# Patient Record
Sex: FEMALE | Race: WHITE | NOT HISPANIC OR LATINO | Employment: UNEMPLOYED | URBAN - METROPOLITAN AREA
[De-identification: names, ages, dates, MRNs, and addresses within clinical notes are randomized per-mention and may not be internally consistent; named-entity substitution may affect disease eponyms.]

---

## 2017-06-05 ENCOUNTER — HOSPITAL ENCOUNTER (INPATIENT)
Facility: HOSPITAL | Age: 42
LOS: 2 days | Discharge: HOME | DRG: 024 | End: 2017-06-07
Attending: EMERGENCY MEDICINE | Admitting: NURSE PRACTITIONER
Payer: COMMERCIAL

## 2017-06-05 DIAGNOSIS — I63.9 CVA (CEREBRAL VASCULAR ACCIDENT): ICD-10-CM

## 2017-06-05 DIAGNOSIS — I25.10 CAD (CORONARY ARTERY DISEASE): ICD-10-CM

## 2017-06-05 DIAGNOSIS — I63.131 EMBOLIC STROKE INVOLVING RIGHT CAROTID ARTERY: ICD-10-CM

## 2017-06-05 PROBLEM — R41.4 HEMINEGLECT: Status: ACTIVE | Noted: 2017-06-05

## 2017-06-05 PROBLEM — E03.9 HYPOTHYROIDISM: Status: ACTIVE | Noted: 2017-06-05

## 2017-06-05 LAB
CHOLEST/HDLC SERPL: 4.4 {RATIO}
HDL/CHOLESTEROL RATIO: 22.8 %
HDLC SERPL-MCNC: 184 MG/DL
HDLC SERPL-MCNC: 42 MG/DL
LDLC SERPL CALC-MCNC: 128.4 MG/DL
NONHDLC SERPL-MCNC: 142 MG/DL
T4 FREE SERPL-MCNC: 1.17 NG/DL
TRIGL SERPL-MCNC: 68 MG/DL

## 2017-06-05 PROCEDURE — 03CK3ZZ EXTIRPATION OF MATTER FROM RIGHT INTERNAL CAROTID ARTERY, PERCUTANEOUS APPROACH: ICD-10-PCS | Performed by: PSYCHIATRY & NEUROLOGY

## 2017-06-05 PROCEDURE — 99291 CRITICAL CARE FIRST HOUR: CPT | Mod: ,,, | Performed by: PSYCHIATRY & NEUROLOGY

## 2017-06-05 PROCEDURE — 96375 TX/PRO/DX INJ NEW DRUG ADDON: CPT

## 2017-06-05 PROCEDURE — 93005 ELECTROCARDIOGRAM TRACING: CPT

## 2017-06-05 PROCEDURE — 96374 THER/PROPH/DIAG INJ IV PUSH: CPT

## 2017-06-05 PROCEDURE — 25000003 PHARM REV CODE 250: Performed by: NURSE PRACTITIONER

## 2017-06-05 PROCEDURE — 037K3DZ DILATION OF RIGHT INTERNAL CAROTID ARTERY WITH INTRALUMINAL DEVICE, PERCUTANEOUS APPROACH: ICD-10-PCS | Performed by: PSYCHIATRY & NEUROLOGY

## 2017-06-05 PROCEDURE — 99291 CRITICAL CARE FIRST HOUR: CPT | Mod: ,,, | Performed by: EMERGENCY MEDICINE

## 2017-06-05 PROCEDURE — 84439 ASSAY OF FREE THYROXINE: CPT

## 2017-06-05 PROCEDURE — B31RZZZ FLUOROSCOPY OF INTRACRANIAL ARTERIES: ICD-10-PCS | Performed by: PSYCHIATRY & NEUROLOGY

## 2017-06-05 PROCEDURE — 99233 SBSQ HOSP IP/OBS HIGH 50: CPT | Mod: ,,, | Performed by: PSYCHIATRY & NEUROLOGY

## 2017-06-05 PROCEDURE — 25500020 PHARM REV CODE 255: Performed by: EMERGENCY MEDICINE

## 2017-06-05 PROCEDURE — 20000000 HC ICU ROOM

## 2017-06-05 PROCEDURE — 83036 HEMOGLOBIN GLYCOSYLATED A1C: CPT

## 2017-06-05 PROCEDURE — 93010 ELECTROCARDIOGRAM REPORT: CPT | Mod: S$GLB,,, | Performed by: INTERNAL MEDICINE

## 2017-06-05 PROCEDURE — 99285 EMERGENCY DEPT VISIT HI MDM: CPT | Mod: 25

## 2017-06-05 PROCEDURE — 25000003 PHARM REV CODE 250: Performed by: PHYSICIAN ASSISTANT

## 2017-06-05 PROCEDURE — 80061 LIPID PANEL: CPT

## 2017-06-05 PROCEDURE — 63600175 PHARM REV CODE 636 W HCPCS: Performed by: RADIOLOGY

## 2017-06-05 RX ORDER — CLOPIDOGREL 300 MG/1
300 TABLET, FILM COATED ORAL ONCE
Status: COMPLETED | OUTPATIENT
Start: 2017-06-05 | End: 2017-06-05

## 2017-06-05 RX ORDER — SODIUM,POTASSIUM PHOSPHATES 280-250MG
2 POWDER IN PACKET (EA) ORAL
Status: DISCONTINUED | OUTPATIENT
Start: 2017-06-05 | End: 2017-06-06

## 2017-06-05 RX ORDER — POTASSIUM CHLORIDE 20 MEQ/15ML
40 SOLUTION ORAL
Status: DISCONTINUED | OUTPATIENT
Start: 2017-06-05 | End: 2017-06-06

## 2017-06-05 RX ORDER — LEVOTHYROXINE SODIUM 125 UG/1
125 TABLET ORAL
Status: DISCONTINUED | OUTPATIENT
Start: 2017-06-06 | End: 2017-06-07 | Stop reason: HOSPADM

## 2017-06-05 RX ORDER — POTASSIUM CHLORIDE 20 MEQ/15ML
60 SOLUTION ORAL
Status: DISCONTINUED | OUTPATIENT
Start: 2017-06-05 | End: 2017-06-06

## 2017-06-05 RX ORDER — LANOLIN ALCOHOL/MO/W.PET/CERES
800 CREAM (GRAM) TOPICAL
Status: DISCONTINUED | OUTPATIENT
Start: 2017-06-05 | End: 2017-06-06

## 2017-06-05 RX ORDER — LABETALOL HYDROCHLORIDE 5 MG/ML
10 INJECTION, SOLUTION INTRAVENOUS EVERY 4 HOURS PRN
Status: DISCONTINUED | OUTPATIENT
Start: 2017-06-05 | End: 2017-06-07 | Stop reason: HOSPADM

## 2017-06-05 RX ORDER — ACETAMINOPHEN 325 MG/1
650 TABLET ORAL EVERY 4 HOURS PRN
Status: DISCONTINUED | OUTPATIENT
Start: 2017-06-05 | End: 2017-06-07 | Stop reason: HOSPADM

## 2017-06-05 RX ORDER — ONDANSETRON 2 MG/ML
INJECTION INTRAMUSCULAR; INTRAVENOUS CODE/TRAUMA/SEDATION MEDICATION
Status: COMPLETED | OUTPATIENT
Start: 2017-06-05 | End: 2017-06-05

## 2017-06-05 RX ORDER — MIDAZOLAM HYDROCHLORIDE 1 MG/ML
4 INJECTION INTRAMUSCULAR; INTRAVENOUS
Status: DISPENSED | OUTPATIENT
Start: 2017-06-05 | End: 2017-06-06

## 2017-06-05 RX ORDER — CLOPIDOGREL BISULFATE 75 MG/1
75 TABLET ORAL DAILY
Status: DISCONTINUED | OUTPATIENT
Start: 2017-06-06 | End: 2017-06-07 | Stop reason: HOSPADM

## 2017-06-05 RX ORDER — INSULIN ASPART 100 [IU]/ML
1-10 INJECTION, SOLUTION INTRAVENOUS; SUBCUTANEOUS EVERY 6 HOURS PRN
Status: DISCONTINUED | OUTPATIENT
Start: 2017-06-05 | End: 2017-06-07 | Stop reason: HOSPADM

## 2017-06-05 RX ORDER — GLUCAGON 1 MG
1 KIT INJECTION
Status: DISCONTINUED | OUTPATIENT
Start: 2017-06-05 | End: 2017-06-07 | Stop reason: HOSPADM

## 2017-06-05 RX ORDER — MIDAZOLAM HYDROCHLORIDE 1 MG/ML
INJECTION, SOLUTION INTRAMUSCULAR; INTRAVENOUS CODE/TRAUMA/SEDATION MEDICATION
Status: COMPLETED | OUTPATIENT
Start: 2017-06-05 | End: 2017-06-05

## 2017-06-05 RX ORDER — ASPIRIN 300 MG/1
300 SUPPOSITORY RECTAL ONCE
Status: DISCONTINUED | OUTPATIENT
Start: 2017-06-05 | End: 2017-06-07

## 2017-06-05 RX ORDER — HYDRALAZINE HYDROCHLORIDE 20 MG/ML
10 INJECTION INTRAMUSCULAR; INTRAVENOUS EVERY 4 HOURS PRN
Status: DISCONTINUED | OUTPATIENT
Start: 2017-06-05 | End: 2017-06-06

## 2017-06-05 RX ORDER — ASPIRIN 325 MG
325 TABLET ORAL DAILY
Status: DISCONTINUED | OUTPATIENT
Start: 2017-06-05 | End: 2017-06-07 | Stop reason: HOSPADM

## 2017-06-05 RX ADMIN — ACETAMINOPHEN 650 MG: 325 TABLET ORAL at 09:06

## 2017-06-05 RX ADMIN — ONDANSETRON 4 MG: 2 INJECTION INTRAMUSCULAR; INTRAVENOUS at 02:06

## 2017-06-05 RX ADMIN — ACETAMINOPHEN 650 MG: 325 TABLET ORAL at 05:06

## 2017-06-05 RX ADMIN — MIDAZOLAM HYDROCHLORIDE 2 MG: 1 INJECTION, SOLUTION INTRAMUSCULAR; INTRAVENOUS at 02:06

## 2017-06-05 RX ADMIN — ASPIRIN 325 MG ORAL TABLET 325 MG: 325 PILL ORAL at 05:06

## 2017-06-05 RX ADMIN — IOHEXOL 100 ML: 350 INJECTION, SOLUTION INTRAVENOUS at 02:06

## 2017-06-05 RX ADMIN — CLOPIDOGREL BISULFATE 300 MG: 300 TABLET, FILM COATED ORAL at 09:06

## 2017-06-05 RX ADMIN — IOHEXOL 65 ML: 300 INJECTION, SOLUTION INTRAVENOUS at 02:06

## 2017-06-05 NOTE — PROGRESS NOTES
[facility]  Neurocritical Care  Progress Note    Admit Date: 6/5/2017  Service Date: 06/05/2017  Length of Stay: 0    Subjective:     Chief Complaint: Embolic stroke involving right carotid artery    History of Present Illness: Aziza Cope is a 42 y.o. female who presents with mild mild headache, nausea, acute onset of left hemiplegia. Last seen normal at 1045 and last heard on phone normal at 1055, family member came back into the house at 1100 to see she had fallen on the couch, noted to have facial droop and left sided weakness. These symptoms have never happened before. There are no identified triggers or modifying factors. There have been no recurrent events. There are no other associated symptoms. Patient given tPA and transferred to [facility] to undergo IR intervention.  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    Hospital Course: 6/5/17: right ICA stent, left ICA terminus thrombectomy    Review of Symptoms:   Constitutional: Denies fevers or chills.  ENT: no hearing difficulty, no visual changes  Pulmonary: Denies shortness of breath or cough.  Cardiology: Denies chest pain or palpitations.  GI: Denies abdominal pain or constipation.  Neurologic: Denies new weakness, headaches, or paresthesias.   : no dysuria  Musk: no muscle pain, no joint pain  Psych: no hallucinations    Physical Exam:  GA: Alert, mild distress, very anxious  HEENT: No scleral icterus or JVD.   Pulmonary: Clear to auscultation A/L. No wheezing, crackles, or rhonchi.  Cardiac: RRR S1 & S2 w/o rubs/murmurs/gallops.   Abdominal: Bowel sounds present x 4. No appreciable hepatosplenomegaly.  Skin: No jaundice, rashes, or visible lesions.  Pulses: 2+ DP bilat    Neuro:  --sedation: none  --GCS: E4V5M6  --Mental Status:  AOx3, anxious  --CN II-XII grossly intact. Other than facial droop  --Pupils 3-->2mm, PERRL.   --brainstem: intact  --Motor: left 4/5, right 5/5  --sensory: intact to soft touch and  pain throughout  --Reflexes: not tested  --Gait: deferred      Recent Labs  Lab 06/05/17  1144   WBC 8.70   RBC 4.85   HGB 15.0   HCT 44.7      MCV 92   MCH 30.8   MCHC 33.5       Recent Labs  Lab 06/05/17  1144   CALCIUM 8.8   PROT 7.5      K 4.2   CO2 22*      BUN 12   CREATININE 0.70   ALKPHOS 69   ALT 46*   AST 33   BILITOT 0.8       Recent Labs  Lab 06/05/17  1144   INR 1.1   APTT 33.8     Oxygen Concentration (%):  [100] 100      I have personally reviewed all labs, imaging, and studies today            Assessment/Plan:     Neuro   * Embolic stroke involving right carotid artery    -s/p tPA  -s/p right ICA web stenting  -s/p left ICA terminus thrombectomy  -- post procedure  --repeat head CT at 1800 then PLX load 300mg with 75mg maintenance for stent  --PT OT SLP  --stroke work up (including hypercoag panel as outpatient)          Hemineglect    Improved after thrombectomy  Still with facial droop        Cardiac   Essential hypertension    SBP goal < 140 post reperfusion  cardene gtt prn        Endocrine   Hypothyroidism    Continue home synthroid            Prophylaxis:  Venous Thromboembolism: mechanical  Stress Ulcer: None  Ventilator Pneumonia: not applicable     Activity Orders          None        Full Code    Abbe Dia MD  Neurocritical Care  [facility] time 32 minutes

## 2017-06-05 NOTE — CONSULTS
[facility]  Vascular Neurology  Comprehensive Stroke Center  Consult Note      Inpatient consult to Vascular (Stroke) Neurology  Consult performed by: BETY ADAMS  Consult ordered by: MARA MACHUCA        Subjective:     History of Present Illness:  42 y.o. female  with PMHx of HTN, hypothyroidism who presented to [facility] with mild headache and nausea starting yesterday with today having acute onset of left hemiplegia. Last seen normal at 1045 and last heard on phone normal at 1055, family member came back into the house at 1100 to see she had fallen on the couch, noted to have facial droop and left sided weakness. These symptoms have never happened before. There are no identified triggers or modifying factors. There have been no recurrent events. There are no other associated symptoms.  Patient seen via telemedicine - was administered tpa for R MCA syndrome. Tpa infusion was completed at 1:20pm.          Past Medical History:   Diagnosis Date    Hypertension     Hypothyroidism      Past Surgical History:   Procedure Laterality Date    THYROIDECTOMY       History reviewed. No pertinent family history.  Social History   Substance Use Topics    Smoking status: Current Every Day Smoker     Types: Vaping with nicotine    Smokeless tobacco: Not on file    Alcohol use No     Review of patient's allergies indicates:  No Known Allergies  Medications: I have reviewed the current medication administration record.    Prescriptions Prior to Admission   Medication Sig Dispense Refill Last Dose    levothyroxine (SYNTHROID) 125 MCG tablet Take 125 mcg by mouth once daily.       triamterene-hydrochlorothiazide 37.5-25 mg (MAXZIDE-25) 37.5-25 mg per tablet Take 1 capsule by mouth once daily. PT STATES SHE IS PRESCRIBED THIS, BUT SHE STOPPED TAKING IT          Review of Systems   Constitutional: Negative for fever.   HENT: Negative for ear discharge.    Eyes: Negative for discharge.    Respiratory: Negative for shortness of breath.    Cardiovascular: Negative for chest pain.   Gastrointestinal: Negative for abdominal pain and nausea.   Endocrine: Negative for polyuria.   Genitourinary: Negative for difficulty urinating.   Musculoskeletal: Negative for arthralgias.   Neurological: Positive for speech difficulty, weakness and headaches.   Psychiatric/Behavioral: Positive for agitation.     Objective:     Vital Signs (Most Recent):  Temp: 97.6 °F (36.4 °C) (17 1530)  Pulse: 64 (17 1620)  Resp: 20 (17 1620)  BP: 118/66 (17 1620)  SpO2: 100 % (17 1620)    Vital Signs Range (Last 24H):  Temp:  [97.6 °F (36.4 °C)-98.2 °F (36.8 °C)]   Pulse:  [55-88]   Resp:  [16-36]   BP: (102-148)/(53-93)   SpO2:  [94 %-100 %]     Physical Exam   Constitutional: She appears well-developed and well-nourished.   HENT:   Head: Normocephalic and atraumatic.   Pulmonary/Chest: Effort normal.       Neurological Exam:   LOC: alert and follows requests  Language: No aphasia  Speech: Dysarthria  Orientation: Person, Place, Time  Visual Fields (CN II): Hemianopsia  left  Pupils (CN III, IV, VI): PERRL  Facial Movement (CN VII): lower weakness left lower  Motor*: Arm Left:  Plegic (0/5), Leg Left:   Paretic:  3/5, Arm Right:   Normal (5/5), Leg Right:   Normal (5/5)  Cerebellar*: Normal limb  Sensation: kristina-hypoesthesia left  Tone: Arm-Left: flaccid; Leg-Left: normal; Arm-Right: normal; Leg-Right: normal  +left sided neglect    NIH Stroke Scale:  Interval: baseline (upon arrival/admit)  Level of Consciousness: 0 - alert  LOC Questions: 0 - answers both correctly  LOC Commands: 0 - performs both correctly  Best Gaze: 2 - forced deviation  Visual: 2 - complete hemianopia  Facial Palsy: 2 - partial  Motor Left Arm: 4 - no movement  Motor Right Arm: 0 - no drift  Motor Left Leg: 3 - no effort against gravity  Motor Right Le - no drift  Limb Ataxia: 0 - absent  Sensory: 1 - mild to moderate  loss  Best Language: 0 - no aphasia  Dysarthria: 1 - mild to moderate dysarthria  Extinction and Inattention: 2 - complete neglect  NIH Stroke Scale Total: 17  Modified Jesus Scale:   Timeline: Prior to symptoms onset  Modified [place] Score: 0 - no symptoms        Laboratory:  CMP:   Recent Labs  Lab 06/05/17  1144   CALCIUM 8.8   ALBUMIN 4.2   PROT 7.5      K 4.2   CO2 22*      BUN 12   CREATININE 0.70   ALKPHOS 69   ALT 46*   AST 33   BILITOT 0.8     BMP:   Recent Labs  Lab 06/05/17  1144      K 4.2      CO2 22*   BUN 12   CREATININE 0.70   CALCIUM 8.8     CBC:   Recent Labs  Lab 06/05/17  1144   WBC 8.70   RBC 4.85   HGB 15.0   HCT 44.7      MCV 92   MCH 30.8   MCHC 33.5     Lipid Panel: No results for input(s): CHOL, LDLCALC, HDL, TRIG in the last 168 hours.  Coagulation:   Recent Labs  Lab 06/05/17  1144   INR 1.1   APTT 33.8     Platelet Aggregation Study: No results for input(s): PLTAGG, PLTAGINTERP, PLTAGREGLACO, ADPPLTAGGREG in the last 168 hours.  Hgb A1C: No results for input(s): HGBA1C in the last 168 hours.  TSH:   Recent Labs  Lab 06/05/17  1144   TSH <0.02*       Diagnostic Results:  Brain Imaging:     CT head without contrast (6/5/17)  Hyperdense R MCA sign. ASPECT score 9.          CTA MP (6/5/17)  Occlusion of the right ICA terminus and right M1 segment with reconstitution of flow distally at the right M2 segment with good collateral formation.  Web in the posteromedial surface of the origin of the right ICA with mild flow limitation.    Assessment/Plan:     Patient is a 42 y.o. year old female with:    * Embolic stroke involving right carotid artery    Patient is a 42 yr old female with PMHx of hypothyroidism, HTN who presented with R MCA syndrome. S/p tpa. CT remarkable for hyperdense MCA sign, ASPECT score of 9. IR angiogram remarkable for R carotid T occlusion. R carotid web also seen - thought to be a possible etiology for the stroke in the absence of other  known stroke risk factors in this young patient. However, if tele this admission unrevealing for PAF, will need longer term cardiac monitoring to complete workup. 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     Antithrombotics for secondary stroke prevention: aspirin 325mg now. Recommend repeat CT head at 6pm. If no hemorrhage on imaging, recommend Plavix load - 300mg followed by 75mg qd - dual antiplatelet therapy and early administration prior to 24 hour window post tpa is in the setting of new R ICA stent.   Statins for secondary stroke prevention and hyperlipidemia, if present: Atorvastatin- 40 mg oral daily  Aggressive risk factor modification: Hypertension, Rehab Efforts: Physical Therapy, Occupational Therapy and Speech and Language Pathology  Diagnostics: Ordered/Pending CT scan of head without contrast to asses brain parenchyma, MRI head without contrast to assess brain parenchyma, Trans-thoracic cardiac echo to assess cardiac function/status   VTE Prophylaxis: hold for 24 hours post tpa (infusion completed at 1:20pm on 6/5)   BP Parameters: SBP <140 post thrombectomy   Nursing Orders: Neuro checks- q1h, Complete FIOR unless evaluated by speech, Seizure precautions, Out of bed BID, Avoid Boswell catheter, Pneumatic compression device, Stroke Education, Blood glucose target 100-130, Up with assistance        Hypothyroidism    Continue home dose synthroid        Hemineglect    2/2 stroke  PT/OT        Acute left hemiparesis    Left arm predominant hemiparesis  2/2 stroke  - PT/OT        Essential hypertension    Stroke risk factor  SBP <140 post thrombectomy          Admit to [facility]    Fina Tilley MD  Comprehensive Stroke Center  Department of Vascular Neurology   [facility]

## 2017-06-05 NOTE — CONSULTS
Consulted due to R ICA occlusion. Hyperdense MCA sign.    Carotid web on the R ICA    Favorable CT scan.  To IR    ASA: 2  Mall 1    Raz Vega MD  Vascular and Interventional Neurology Staff  Director of [facility] Stroke Center  [facility]  [phone]

## 2017-06-05 NOTE — HOSPITAL COURSE
6/5/17: right ICA stent, left ICA terminus thrombectomy  6/6 24h MRI Brain pending. PT/OT/SLP. Transfer to floor under [facility]. Neuro service.

## 2017-06-05 NOTE — ED PROVIDER NOTES
Encounter Date: 6/5/2017    SCRIBE #1 NOTE: I, Sunshine Medina, am scribing for, and in the presence of,  Dr. Hong. I have scribed the entire note.       History     Chief Complaint   Patient presents with    transfer     post tpa from [facility]     Review of patient's allergies indicates:  No Known Allergies  Time patient was seen by the provider: 1:33 PM      The patient is a 42 y.o. female with hx of: HTN and hypothyroidisim that presents to the ED via EMS from [facility] after arriving there approximately 2 hours ago with left-sided facial droop as well as left upper and lower extremity weakness and slurred speech. She received tPA prior to arrival and [place] [facility] [place]ic work up and evaluation. Upon arrival, patient still complains of left-sided weakness and inability to life the left upper extremity. She additionally reports nausea and a headache.            Past Medical History:   Diagnosis Date    Hypertension     Hypothyroidism      Past Surgical History:   Procedure Laterality Date    THYROIDECTOMY       History reviewed. No pertinent family history.  Social History   Substance Use Topics    Smoking status: Current Every Day Smoker     Types: Vaping with nicotine    Smokeless tobacco: Not on file    Alcohol use No     Review of Systems   Constitutional: Negative for chills.   HENT: Negative for congestion.    Eyes: Negative for redness.   Respiratory: Negative for cough and shortness of breath.    Cardiovascular: Negative for chest pain.   Gastrointestinal: Positive for nausea.   Genitourinary: Negative for difficulty urinating and dysuria.   Musculoskeletal: Negative for back pain and neck pain.   Skin: Negative for rash.   Neurological: Positive for weakness (left sided) and headaches.       Physical Exam     Initial Vitals [06/05/17 1334]   BP Pulse Resp Temp SpO2   110/70 69 20 98 °F (36.7 °C) (!) 94 %     Physical Exam    Nursing note and vitals  reviewed.  Constitutional: She appears well-developed and well-nourished. She is not diaphoretic. No distress.   HENT:   Head: Normocephalic and atraumatic.   Mouth/Throat: Oropharynx is clear and moist.   Eyes: Conjunctivae and EOM are normal. Pupils are equal, round, and reactive to light.   Neck: Normal range of motion. Neck supple. No JVD present.   Cardiovascular: Normal rate, regular rhythm and normal heart sounds.   No murmur heard.  Pulmonary/Chest: Breath sounds normal. No respiratory distress. She has no wheezes. She has no rhonchi. She has no rales.   Abdominal: Soft. Bowel sounds are normal. She exhibits no distension and no mass. There is no tenderness. There is no rebound and no guarding.   Musculoskeletal: Normal range of motion. She exhibits no edema or tenderness.   Neurological:   Has left-sided hemiparesis. Speech clear.    Skin: Skin is warm and dry. No rash noted.   Psychiatric: She has a normal mood and affect.         ED Course   Procedures  Labs Reviewed - No data to display          Medical Decision Making:   History:   Old Medical Records: I decided to obtain old medical records.  Initial Assessment:   Patient with acute stroke. Notified stroke team upon arrival. She was agitated in CT scan so gave a dose of Versed.  Clinical Tests:   Radiological Study: Ordered and Reviewed  Medical Tests: Ordered and Reviewed  Other:   I have discussed this case with another health care provider.            Scribe Attestation:   Scribe #1: I performed the above scribed service and the documentation accurately describes the services I performed. I attest to the accuracy of the note.    Attending Attestation:         Attending Critical Care:   Critical Care Times:   Direct Patient Care (initial evaluation, reassessments, and time considering the case)................................................................16 minutes.   Additional History from reviewing old medical records or taking additional  history from the family, EMS, PCP, etc.......................4 minutes.   Ordering, Reviewing, and Interpreting Diagnostic Studies...............................................................................................................4 minutes.   Documentation..................................................................................................................................................................................4 minutes.   Consultation with other Physicians. .................................................................................................................................................4 minutes.   ==============================================================  · Total Critical Care Time - exclusive of procedural time: 32 minutes.  ==============================================================    Physician Attestation for Scribe:  Physician Attestation Statement for Scribe #1: I, Dr. Hong, reviewed documentation, as scribed by Sunshine Medina in my presence, and it is both accurate and complete.         Attending ED Notes:   1:57 PM   Patient sent emergently to interventional radiology after CTA.           ED Course     Clinical Impression:   There were no encounter diagnoses.          Mauro Hong MD  06/14/17 1031

## 2017-06-05 NOTE — SUBJECTIVE & OBJECTIVE
Past Medical History:   Diagnosis Date    Hypertension     Hypothyroidism      Past Surgical History:   Procedure Laterality Date    THYROIDECTOMY       History reviewed. No pertinent family history.  Social History   Substance Use Topics    Smoking status: Current Every Day Smoker     Types: Vaping with nicotine    Smokeless tobacco: Not on file    Alcohol use No     Review of patient's allergies indicates:  No Known Allergies  Medications: I have reviewed the current medication administration record.    Prescriptions Prior to Admission   Medication Sig Dispense Refill Last Dose    levothyroxine (SYNTHROID) 125 MCG tablet Take 125 mcg by mouth once daily.       triamterene-hydrochlorothiazide 37.5-25 mg (MAXZIDE-25) 37.5-25 mg per tablet Take 1 capsule by mouth once daily. PT STATES SHE IS PRESCRIBED THIS, BUT SHE STOPPED TAKING IT          Review of Systems   Constitutional: Negative for fever.   HENT: Negative for ear discharge.    Eyes: Negative for discharge.   Respiratory: Negative for shortness of breath.    Cardiovascular: Negative for chest pain.   Gastrointestinal: Negative for abdominal pain and nausea.   Endocrine: Negative for polyuria.   Genitourinary: Negative for difficulty urinating.   Musculoskeletal: Negative for arthralgias.   Neurological: Positive for speech difficulty, weakness and headaches.   Psychiatric/Behavioral: Positive for agitation.     Objective:     Vital Signs (Most Recent):  Temp: 97.6 °F (36.4 °C) (06/05/17 1530)  Pulse: 64 (06/05/17 1620)  Resp: 20 (06/05/17 1620)  BP: 118/66 (06/05/17 1620)  SpO2: 100 % (06/05/17 1620)    Vital Signs Range (Last 24H):  Temp:  [97.6 °F (36.4 °C)-98.2 °F (36.8 °C)]   Pulse:  [55-88]   Resp:  [16-36]   BP: (102-148)/(53-93)   SpO2:  [94 %-100 %]     Physical Exam   Constitutional: She appears well-developed and well-nourished.   HENT:   Head: Normocephalic and atraumatic.   Pulmonary/Chest: Effort normal.       Neurological Exam:    LOC: alert and follows requests  Language: No aphasia  Speech: Dysarthria  Orientation: Person, Place, Time  Visual Fields (CN II): Hemianopsia  left  Pupils (CN III, IV, VI): PERRL  Facial Movement (CN VII): lower weakness left lower  Motor*: Arm Left:  Plegic (0/5), Leg Left:   Paretic:  3/5, Arm Right:   Normal (5/5), Leg Right:   Normal (5/5)  Cerebellar*: Normal limb  Sensation: kristina-hypoesthesia left  Tone: Arm-Left: flaccid; Leg-Left: normal; Arm-Right: normal; Leg-Right: normal  +left sided neglect    NIH Stroke Scale:  Interval: baseline (upon arrival/admit)  Level of Consciousness: 0 - alert  LOC Questions: 0 - answers both correctly  LOC Commands: 0 - performs both correctly  Best Gaze: 2 - forced deviation  Visual: 2 - complete hemianopia  Facial Palsy: 2 - partial  Motor Left Arm: 4 - no movement  Motor Right Arm: 0 - no drift  Motor Left Leg: 3 - no effort against gravity  Motor Right Le - no drift  Limb Ataxia: 0 - absent  Sensory: 1 - mild to moderate loss  Best Language: 0 - no aphasia  Dysarthria: 1 - mild to moderate dysarthria  Extinction and Inattention: 2 - complete neglect  NIH Stroke Scale Total: 17  Modified Jesus Scale:   Timeline: Prior to symptoms onset  Modified Jesus Score: 0 - no symptoms        Laboratory:  CMP:   Recent Labs  Lab 17  1144   CALCIUM 8.8   ALBUMIN 4.2   PROT 7.5      K 4.2   CO2 22*      BUN 12   CREATININE 0.70   ALKPHOS 69   ALT 46*   AST 33   BILITOT 0.8     BMP:   Recent Labs  Lab 17  1144      K 4.2      CO2 22*   BUN 12   CREATININE 0.70   CALCIUM 8.8     CBC:   Recent Labs  Lab 17  1144   WBC 8.70   RBC 4.85   HGB 15.0   HCT 44.7      MCV 92   MCH 30.8   MCHC 33.5     Lipid Panel: No results for input(s): CHOL, LDLCALC, HDL, TRIG in the last 168 hours.  Coagulation:   Recent Labs  Lab 17  1144   INR 1.1   APTT 33.8     Platelet Aggregation Study: No results for input(s): PLTAGG, PLTAGINTERP,  PLTAGREGLACO, ADPPLTAGGREG in the last 168 hours.  Hgb A1C: No results for input(s): HGBA1C in the last 168 hours.  TSH:   Recent Labs  Lab 06/05/17  1144   TSH <0.02*       Diagnostic Results:  Brain Imaging:     CT head without contrast (6/5/17)  Hyperdense R MCA sign. ASPECT score 9.          CTA MP (6/5/17)  Occlusion of the right ICA terminus and right M1 segment with reconstitution of flow distally at the right M2 segment with good collateral formation.  Web in the posteromedial surface of the origin of the right ICA with mild flow limitation.

## 2017-06-05 NOTE — H&P
[facility]  Neurocritical Care  History and Physical Note    Admit Date: 6/5/2017  Service Date: 06/05/2017  Length of Stay: 0    Subjective:     Chief Complaint: Embolic stroke involving right carotid artery    History of Present Illness: Aziza Cope is a 42 y.o. female who presents with mild mild headache, nausea, acute onset of left hemiplegia. Last seen normal at 1045 and last heard on phone normal at 1055, family member came back into the house at 1100 to see she had fallen on the couch, noted to have facial droop and left sided weakness. These symptoms have never happened before. There are no identified triggers or modifying factors. There have been no recurrent events. There are no other associated symptoms. Patient given tPA and transferred to [facility] to undergo IR intervention.  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    Hospital Course: 6/5/17: right ICA stent, left ICA terminus thrombectomy    Review of Symptoms:   Constitutional: Denies fevers or chills.  ENT: no hearing difficulty, no visual changes  Pulmonary: Denies shortness of breath or cough.  Cardiology: Denies chest pain or palpitations.  GI: Denies abdominal pain or constipation.  Neurologic: Denies new weakness, headaches, or paresthesias.   : no dysuria  Musk: no muscle pain, no joint pain  Psych: no hallucinations    Physical Exam:  GA: Alert, mild distress, very anxious  HEENT: No scleral icterus or JVD.   Pulmonary: Clear to auscultation A/L. No wheezing, crackles, or rhonchi.  Cardiac: RRR S1 & S2 w/o rubs/murmurs/gallops.   Abdominal: Bowel sounds present x 4. No appreciable hepatosplenomegaly.  Skin: No jaundice, rashes, or visible lesions.  Pulses: 2+ DP bilat    Neuro:  --sedation: none  --GCS: E4V5M6  --Mental Status:  AOx3, anxious  --CN II-XII grossly intact. Other than facial droop  --Pupils 3-->2mm, PERRL.   --brainstem: intact  --Motor: left 4/5, right 5/5  --sensory: intact to  soft touch and pain throughout  --Reflexes: not tested  --Gait: deferred      Recent Labs  Lab 06/05/17  1144   WBC 8.70   RBC 4.85   HGB 15.0   HCT 44.7      MCV 92   MCH 30.8   MCHC 33.5       Recent Labs  Lab 06/05/17  1144   CALCIUM 8.8   PROT 7.5      K 4.2   CO2 22*      BUN 12   CREATININE 0.70   ALKPHOS 69   ALT 46*   AST 33   BILITOT 0.8       Recent Labs  Lab 06/05/17  1144   INR 1.1   APTT 33.8     Oxygen Concentration (%):  [100] 100      I have personally reviewed all labs, imaging, and studies today            Assessment/Plan:     Neuro   * Embolic stroke involving right carotid artery    -s/p tPA  -s/p right ICA web stenting  -s/p left ICA terminus thrombectomy  -- post procedure  --repeat head CT at 1800 then PLX load 300mg with 75mg maintenance for stent  --PT OT SLP  --stroke work up (including hypercoag panel as outpatient)          Hemineglect    Improved after thrombectomy  Still with facial droop        Cardiac   Essential hypertension    SBP goal < 140 post reperfusion  cardene gtt prn        Endocrine   Hypothyroidism    Continue home synthroid            Prophylaxis:  Venous Thromboembolism: mechanical  Stress Ulcer: None  Ventilator Pneumonia: not applicable     Activity Orders          None        Full Code    Abbe Dia MD  Neurocritical Care  [facility]    Cc time 32 minutes

## 2017-06-05 NOTE — HPI
42 y.o. female with PMHx of HTN, hypothyroidism who presented to [facility] with mild headache and nausea starting yesterday with today having acute onset of left hemiplegia. Last seen normal at 1045 and last heard on phone normal at 1055, family member came back into the house at 1100 to see she had fallen on the couch, noted to have facial droop and left sided weakness. These symptoms have never happened before. There are no identified triggers or modifying factors. There have been no recurrent events. There are no other associated symptoms.  Patient seen via telemedicine - was administered tpa for R MCA syndrome. Tpa infusion was completed at 1:20pm.

## 2017-06-05 NOTE — PROGRESS NOTES
1426: TICI 2C ICA  1431: TICI 3 MCA    1435: Hemostasis achieved to Right groin via angioseal closure device.

## 2017-06-05 NOTE — NURSING
Patient arrived to [facility] from IR. NCC at bedside.     Type of Stroke: R ICA, ischemic     TPA start time and end time: bolus 8 mg at 1218; infusion of 73 mg at 1220; end time 1320.    Patients current symptoms include L side facial droop, L side numbness (intermitent).

## 2017-06-05 NOTE — HPI
Aziza Cope is a 42 y.o. female who presents with mild mild headache, nausea, acute onset of left hemiplegia. Last seen normal at 1045 and last heard on phone normal at 1055, family member came back into the house at 1100 to see she had fallen on the couch, noted to have facial droop and left sided weakness. These symptoms have never happened before. There are no identified triggers or modifying factors. There have been no recurrent events. There are no other associated symptoms. Patient given tPA and transferred to [facility] to undergo IR intervention.  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

## 2017-06-05 NOTE — CONSULTS
Radiology Post-Procedure Note    Pre Op Diagnosis: R ICA occlusion    Post Op Diagnosis: R ICA web/JOSÉ LUIS/MCA occlusion, treated w stent and aspiration to tici 3    Procedure: Cerebral angiogram, carotid stent, thrombectomy    Procedure performed by: Len Vega    Written Informed Consent Obtained: Yes    Specimen Removed: YES clot    Estimated Blood Loss: 200     Procedure report:     A 8F sheath was placed into the right femoral artery and a 5F Rashad catheter was advanced into the aortic arch.  The rigth common and internal carotid arteries were subselected and angiography of the brain was performed after injection into each of these vessels.    Preliminary interpretation: R ICA web, distal ICA occlusion.  Treated w stent and aspiration to TICI 3 respectively.  Please see Imaging report for full details.    A right femoral artery angiogram was performed, the sheath removed and hemostasis achieved using angioseal.  No hematoma was present at the time of hemostasis.    The patient tolerated the procedure well.     Raz Vega MD  Vascular and Interventional Neurology Staff  Director of [facility]  [phone]

## 2017-06-05 NOTE — ED TRIAGE NOTES
Pt presents from [facility] post TPA. Patient was last known normal at 1055 this am, patient was found at 1100 by family with left sided facial droop, left arm and leg weakness, slurred speech. Family states that patient has had headache since yesterday. Patient was given 8mg bolus of TPA at 1218 and 73mg infusion at 1220. Infusion finished en route with EMS at 1320.

## 2017-06-06 PROBLEM — Z72.0 TOBACCO ABUSE: Status: ACTIVE | Noted: 2017-06-06

## 2017-06-06 PROBLEM — F17.200 SMOKER: Status: ACTIVE | Noted: 2017-06-06

## 2017-06-06 PROBLEM — I77.3 FIBROMUSCULAR DYSPLASIA OF CERVICOCRANIAL ARTERY: Status: ACTIVE | Noted: 2017-06-06

## 2017-06-06 LAB
ALBUMIN SERPL BCP-MCNC: 3 G/DL
ALP SERPL-CCNC: 68 U/L
ALT SERPL W/O P-5'-P-CCNC: 28 U/L
ANION GAP SERPL CALC-SCNC: 9 MMOL/L
AST SERPL-CCNC: 21 U/L
BASOPHILS # BLD AUTO: 0.02 K/UL
BASOPHILS NFR BLD: 0.2 %
BILIRUB SERPL-MCNC: 0.5 MG/DL
BUN SERPL-MCNC: 11 MG/DL
CALCIUM SERPL-MCNC: 8.1 MG/DL
CHLORIDE SERPL-SCNC: 109 MMOL/L
CO2 SERPL-SCNC: 20 MMOL/L
CREAT SERPL-MCNC: 0.8 MG/DL
DIFFERENTIAL METHOD: NORMAL
EOSINOPHIL # BLD AUTO: 0.2 K/UL
EOSINOPHIL NFR BLD: 1.8 %
ERYTHROCYTE [DISTWIDTH] IN BLOOD BY AUTOMATED COUNT: 13.7 %
EST. GFR  (AFRICAN AMERICAN): >60 ML/MIN/1.73 M^2
EST. GFR  (NON AFRICAN AMERICAN): >60 ML/MIN/1.73 M^2
ESTIMATED AVG GLUCOSE: 128 MG/DL
GLUCOSE SERPL-MCNC: 123 MG/DL
HBA1C MFR BLD HPLC: 6.1 %
HCT VFR BLD AUTO: 39 %
HGB BLD-MCNC: 13.1 G/DL
LYMPHOCYTES # BLD AUTO: 3.1 K/UL
LYMPHOCYTES NFR BLD: 32 %
MAGNESIUM SERPL-MCNC: 2.1 MG/DL
MCH RBC QN AUTO: 30.5 PG
MCHC RBC AUTO-ENTMCNC: 33.6 %
MCV RBC AUTO: 91 FL
MONOCYTES # BLD AUTO: 0.9 K/UL
MONOCYTES NFR BLD: 8.7 %
NEUTROPHILS # BLD AUTO: 5.6 K/UL
NEUTROPHILS NFR BLD: 57.3 %
PHOSPHATE SERPL-MCNC: 3.7 MG/DL
PLATELET # BLD AUTO: 255 K/UL
PLATELET BLD QL SMEAR: NORMAL
PMV BLD AUTO: 10.3 FL
POCT GLUCOSE: 111 MG/DL (ref 70–110)
POCT GLUCOSE: 112 MG/DL (ref 70–110)
POCT GLUCOSE: 81 MG/DL (ref 70–110)
POTASSIUM SERPL-SCNC: 3.9 MMOL/L
PROT SERPL-MCNC: 6.1 G/DL
RBC # BLD AUTO: 4.3 M/UL
SODIUM SERPL-SCNC: 138 MMOL/L
WBC # BLD AUTO: 9.74 K/UL

## 2017-06-06 PROCEDURE — 97530 THERAPEUTIC ACTIVITIES: CPT

## 2017-06-06 PROCEDURE — 99233 SBSQ HOSP IP/OBS HIGH 50: CPT | Mod: ,,, | Performed by: NURSE PRACTITIONER

## 2017-06-06 PROCEDURE — 80053 COMPREHEN METABOLIC PANEL: CPT

## 2017-06-06 PROCEDURE — 25000003 PHARM REV CODE 250: Performed by: NURSE PRACTITIONER

## 2017-06-06 PROCEDURE — 85025 COMPLETE CBC W/AUTO DIFF WBC: CPT

## 2017-06-06 PROCEDURE — 99233 SBSQ HOSP IP/OBS HIGH 50: CPT | Mod: ,,, | Performed by: PSYCHIATRY & NEUROLOGY

## 2017-06-06 PROCEDURE — 83735 ASSAY OF MAGNESIUM: CPT

## 2017-06-06 PROCEDURE — 63600175 PHARM REV CODE 636 W HCPCS: Performed by: PHYSICIAN ASSISTANT

## 2017-06-06 PROCEDURE — 20600001 HC STEP DOWN PRIVATE ROOM

## 2017-06-06 PROCEDURE — 25000003 PHARM REV CODE 250: Performed by: PHYSICIAN ASSISTANT

## 2017-06-06 PROCEDURE — 97165 OT EVAL LOW COMPLEX 30 MIN: CPT

## 2017-06-06 PROCEDURE — 84100 ASSAY OF PHOSPHORUS: CPT

## 2017-06-06 PROCEDURE — 97162 PT EVAL MOD COMPLEX 30 MIN: CPT

## 2017-06-06 RX ORDER — ATORVASTATIN CALCIUM 20 MG/1
40 TABLET, FILM COATED ORAL DAILY
Status: DISCONTINUED | OUTPATIENT
Start: 2017-06-06 | End: 2017-06-07 | Stop reason: HOSPADM

## 2017-06-06 RX ORDER — AMOXICILLIN 250 MG
1 CAPSULE ORAL DAILY
Status: DISCONTINUED | OUTPATIENT
Start: 2017-06-06 | End: 2017-06-07 | Stop reason: HOSPADM

## 2017-06-06 RX ORDER — IBUPROFEN 200 MG
1 TABLET ORAL DAILY
Status: DISCONTINUED | OUTPATIENT
Start: 2017-06-06 | End: 2017-06-07 | Stop reason: HOSPADM

## 2017-06-06 RX ORDER — ENOXAPARIN SODIUM 100 MG/ML
40 INJECTION SUBCUTANEOUS EVERY 24 HOURS
Status: DISCONTINUED | OUTPATIENT
Start: 2017-06-06 | End: 2017-06-07 | Stop reason: HOSPADM

## 2017-06-06 RX ORDER — POLYETHYLENE GLYCOL 3350 17 G/17G
17 POWDER, FOR SOLUTION ORAL DAILY
Status: DISCONTINUED | OUTPATIENT
Start: 2017-06-06 | End: 2017-06-07 | Stop reason: HOSPADM

## 2017-06-06 RX ADMIN — LEVOTHYROXINE SODIUM 125 MCG: 125 TABLET ORAL at 05:06

## 2017-06-06 RX ADMIN — CLOPIDOGREL 75 MG: 75 TABLET, FILM COATED ORAL at 09:06

## 2017-06-06 RX ADMIN — ACETAMINOPHEN 650 MG: 325 TABLET ORAL at 05:06

## 2017-06-06 RX ADMIN — ATORVASTATIN CALCIUM 40 MG: 20 TABLET, FILM COATED ORAL at 02:06

## 2017-06-06 RX ADMIN — NICOTINE 1 PATCH: 14 PATCH, EXTENDED RELEASE TRANSDERMAL at 02:06

## 2017-06-06 RX ADMIN — ACETAMINOPHEN 650 MG: 325 TABLET ORAL at 02:06

## 2017-06-06 RX ADMIN — ASPIRIN 325 MG ORAL TABLET 325 MG: 325 PILL ORAL at 09:06

## 2017-06-06 RX ADMIN — ACETAMINOPHEN 650 MG: 325 TABLET ORAL at 06:06

## 2017-06-06 RX ADMIN — STANDARDIZED SENNA CONCENTRATE AND DOCUSATE SODIUM 1 TABLET: 8.6; 5 TABLET, FILM COATED ORAL at 02:06

## 2017-06-06 RX ADMIN — ACETAMINOPHEN 650 MG: 325 TABLET ORAL at 09:06

## 2017-06-06 RX ADMIN — ENOXAPARIN SODIUM 40 MG: 40 INJECTION SUBCUTANEOUS at 05:06

## 2017-06-06 NOTE — PLAN OF CARE
Problem: Patient Care Overview  Goal: Plan of Care Review  Outcome: Ongoing (interventions implemented as appropriate)  No acute events occurred throughout the shift. CT scan completed at 19:45pm. See flowsheets for assessments and VS. Plan of care reviewed with Aziza Cope and Aziza Cope's family. All questions answered in turn. Pt progressing towards goals as noted. Will continue to monitor.

## 2017-06-06 NOTE — PROGRESS NOTES
"Pt stated she is feeling light headed and "just doesn't feel right." This is new tonight she states, and wants doctors notified. No neuro changes, no new weakness, assessment and VS stable. NCC notified, no new orders. Will continue to monitor.  "

## 2017-06-06 NOTE — PLAN OF CARE
Plan of care reviewed with patient. Transferred from neuro ICU this shift. Vital signs stable. SBP <140.  Slight left facial droop- otherwise neuro intact. Free from falls and injuries. Will continue to monitor.

## 2017-06-06 NOTE — PLAN OF CARE
Problem: Patient Care Overview  Goal: Plan of Care Review  Outcome: Ongoing (interventions implemented as appropriate)  POC reviewed with Aziza Cope and family at 1800. Pt verbalized understanding. Questions and concerns addressed. Patient continues to state that she does not believe that she had a stroke. NIH of 4 on arrival to unit. Pt passed FIOR with recommendation for regular diet. CT planned for tonight. Pt progressing toward goals. Will continue to monitor. See flowsheets for full assessment and VS info.

## 2017-06-06 NOTE — PROGRESS NOTES
[facility]  Vascular Neurology  Comprehensive Stroke Center  Progress Note    Neurologic Chief Complaint: R ICA web/JOSÉ LUIS/MCA occlusion, treated w stent and aspiration to tici 3    Subjective:     Interval History: Patient is seen for follow-up neurological assessment and treatment recommendations: doing well, no complaints.   24 hours post tpa will discharge patient.     HPI, Past Medical, Family, and Social History remains the same as documented in the initial encounter.     Review of Systems   Constitutional: Negative for fever.   Cardiovascular: Negative for leg swelling.   Neurological: Positive for facial asymmetry. Negative for speech difficulty and weakness.     Scheduled Meds:   aspirin  300 mg Rectal Once    aspirin  325 mg Oral Daily    atorvastatin  40 mg Oral Daily    clopidogrel  75 mg Oral Daily    levothyroxine  125 mcg Oral Before breakfast    nicotine  1 patch Transdermal Daily    polyethylene glycol  17 g Oral Daily    senna-docusate 8.6-50 mg  1 tablet Oral Daily     Continuous Infusions:   PRN Meds:acetaminophen, dextrose 50%, glucagon (human recombinant), hydrALAZINE, insulin aspart, labetalol, magnesium oxide, magnesium oxide, potassium chloride 10%, potassium chloride 10%, potassium chloride 10%, potassium, sodium phosphates, potassium, sodium phosphates, potassium, sodium phosphates    Objective:     Vital Signs (Most Recent):  Temp: 97.9 °F (36.6 °C) (06/06/17 1105)  Pulse: (!) 50 (06/06/17 1200)  Resp: 20 (06/06/17 1200)  BP: (!) 104/59 (06/06/17 1200)  SpO2: 97 % (06/06/17 1200)  BP Location: Left arm    Vital Signs Range (Last 24H):  Temp:  [97.6 °F (36.4 °C)-98.3 °F (36.8 °C)]   Pulse:  [50-88]   Resp:  [16-43]   BP: ()/(48-90)   SpO2:  [97 %-100 %]   BP Location: Left arm    Physical Exam   Constitutional: She is oriented to person, place, and time. She appears well-developed and well-nourished.   HENT:   Head: Normocephalic.   Cardiovascular: Normal  rate.    Pulmonary/Chest: Effort normal.   Musculoskeletal: Normal range of motion.   Neurological: She is alert and oriented to person, place, and time.   Skin: Skin is warm and dry.   Nursing note and vitals reviewed.      Neurological Exam:   LOC: alert and follows requests  Language: No aphasia  Speech: No dysarthria  Orientation: Person, Place, Time  Visual Fields (CN II): Full  EOM (CN III, IV, VI): Full/intact  Facial Movement (CN VII): L facial droop - very mild   Motor*: Arm Left:  Normal (5/5), Leg Left:   Normal (5/5), Arm Right:   Normal (5/5), Leg Right:   Normal (5/5)  Sensation: intact to light touch, temperature and vibration  Tone: Arm-Left: normal; Leg-Left: normal; Arm-Right: normal; Leg-Right: normal    NIH Stroke Scale:    Level of Consciousness: 0 - alert  LOC Questions: 0 - answers both correctly  LOC Commands: 0 - performs both correctly  Best Gaze: 0 - normal  Visual: 0 - no visual loss  Facial Palsy: 1 - minor  Motor Left Arm: 0 - no drift  Motor Right Arm: 0 - no drift  Motor Left Le - no drift  Motor Right Le - no drift  Limb Ataxia: 0 - absent  Sensory: 0 - normal  Best Language: 0 - no aphasia  Dysarthria: 0 - normal articulation  Extinction and Inattention: 0 - no neglect  NIH Stroke Scale Total: 1  Modified [place] Scale:   Timeline: At discharge  Modified [place] Score: 1 - no significant disability        Laboratory:  CMP:   Recent Labs  Lab 17  0245   CALCIUM 8.1*   ALBUMIN 3.0*   PROT 6.1      K 3.9   CO2 20*      BUN 11   CREATININE 0.8   ALKPHOS 68   ALT 28   AST 21   BILITOT 0.5     CBC:   Recent Labs  Lab 17  0245   WBC 9.74   RBC 4.30   HGB 13.1   HCT 39.0      MCV 91   MCH 30.5   MCHC 33.6     Lipid Panel:   Recent Labs  Lab 17  1655   CHOL 184   LDLCALC 128.4   HDL 42   TRIG 68     Coagulation:   Recent Labs  Lab 17  1144   INR 1.1   APTT 33.8     Platelet Aggregation Study: No results for input(s): PLTAGG, PLTAGINTERP,  PLTAGREGLACO, ADPPLTAGGREG in the last 168 hours.  Hgb A1C:   Recent Labs  Lab 06/05/17  1655   HGBA1C 6.1     TSH:   Recent Labs  Lab 06/05/17  1144   TSH <0.02*       Diagnostic Results:  I have personally reviewed:  CT head 6/5/17  Evolving infarct in the right MCA territory, particularly involving the inferior temporal lobe and inferior parietal lobe, no significant mass effect, hydrocephalus, or hemorrhage.    CTA multiphase  6/5/17  Occlusion of the right ICA terminus and right M1 segment with reconstitution of flow distally at the right M2 segment with good collateral formation.    Web in the posteromedial surface of the origin of the right ICA with mild flow limitation.  This is the suspected source of emboli.    Additional findings as above.    This report has been flagged in the [facility] medical record.      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 Vega outpatient          Hypothyroidism    Continue home dose synthroid        Hemineglect    2/2 stroke  PT/OT        Acute left hemiparesis    Left arm predominant hemiparesis  2/2 stroke  - PT/OT        Essential hypertension    Stroke risk factor  SBP <140 post thrombectomy        * Embolic stroke involving right carotid artery    Patient is a 42 yr old female with PMHx of hypothyroidism, HTN who presented with R MCA syndrome. S/p tpa. CT remarkable for hyperdense MCA sign, ASPECT score of 9. IR angiogram remarkable for R carotid T occlusion. R carotid web also seen - thought to be a possible etiology for the stroke in the absence of other known stroke risk factors in this young patient. However, if tele this admission unrevealing for PAF, will need longer term cardiac monitoring to complete workup. 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     Antithrombotics for secondary stroke prevention: ASA and plavix   Statins for secondary stroke  prevention and hyperlipidemia, if present: Atorvastatin- 40 mg oral daily  Aggressive risk factor modification: Hypertension, Rehab Efforts: Physical Therapy, Occupational Therapy and Speech and Language Pathology  Diagnostics: Ordered/Pending - eval by pt ot speech   VTE Prophylaxis: hep vte   BP Parameters: SBP <140 post thrombectomy   Nursing Orders: neuro checks q 1 hour- can step down at 24 hours post intervention             ANAHI Purdy  [facility]  Department of Vascular Neurology   [facility]

## 2017-06-06 NOTE — PROGRESS NOTES
[facility]  Neurocritical Care  Progress Note    Admit Date: 6/5/2017  Service Date: 06/06/2017  Length of Stay: 1    Subjective:     Chief Complaint: Embolic stroke involving right carotid artery    History of Present Illness: zAiza Cope is a 42 y.o. female who presents with mild mild headache, nausea, acute onset of left hemiplegia. Last seen normal at 1045 and last heard on phone normal at 1055, family member came back into the house at 1100 to see she had fallen on the couch, noted to have facial droop and left sided weakness. These symptoms have never happened before. There are no identified triggers or modifying factors. There have been no recurrent events. There are no other associated symptoms. Patient given tPA and transferred to [facility] to undergo IR intervention.  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    Hospital Course: 6/5/17: right ICA stent, left ICA terminus thrombectomy  6/6 24h MRI Brain pending. PT/OT/SLP. Transfer to floor under vasc. Neuro service.    Interval History: No acute events overnight. 24h MRI pending. Will step down to vas. Neurology today.    Review of Systems: impulsive  Constitutional: Denies fevers or chills.  Pulmonary: Denies shortness of breath or cough.  Cardiology: Denies chest pain or palpitations.  GI: Denies abdominal pain or constipation.  Neurologic: Denies new weakness,  headache, or paresthesias.    Vitals:   Temp: 98.1 °F (36.7 °C) (06/06/17 1500)  Pulse: 84 (06/06/17 1500)  Resp: 20 (06/06/17 1500)  BP: 100/75 (06/06/17 1400)  SpO2: 95 % (06/06/17 1500)    Temp:  [97.9 °F (36.6 °C)-98.3 °F (36.8 °C)] 98.1 °F (36.7 °C)  Pulse:  [50-87] 84  Resp:  [17-44] 20  SpO2:  [95 %-100 %] 95 %  BP: ()/(48-90) 100/75              06/05 0701 - 06/06 0700  In: -   Out: 1550 [Urine:1550]     Examination:   Constitutional: Well-nourished and -developed. No apparent distress.   Eyes: Conjunctiva clear, anicteric. Lids no  lesions.  Head/Ears/Nose/Mouth/Throat/Neck: Moist mucous membranes. External ears, nose atraumatic.   Cardiovascular: Regular rhythm. No murmurs. No leg edema.  Respiratory: Comfortable respirations. Clear to auscultation.  Gastrointestinal: No hernia. Soft, nondistended, nontender. + bowel sounds.    Neurologic:  -GCS E4V5M6  -Alert. Oriented to person, place, and time. Speech fluent. Follows commands.  -Cranial nerves II-XII grossly intact  -Motor RUE/RLE 5/5 LUE/LLE 4/5  -Sensation bilat intact to all extremities      Medications:   Continuous Scheduled  aspirin 300 mg Once   aspirin 325 mg Daily   atorvastatin 40 mg Daily   clopidogrel 75 mg Daily   enoxaparin 40 mg Daily   levothyroxine 125 mcg Before breakfast   nicotine 1 patch Daily   polyethylene glycol 17 g Daily   senna-docusate 8.6-50 mg 1 tablet Daily   PRN  acetaminophen 650 mg Q4H PRN   dextrose 50% 12.5 g PRN   glucagon (human recombinant) 1 mg PRN   hydrALAZINE 10 mg Q4H PRN   insulin aspart 1-10 Units Q6H PRN   labetalol 10 mg Q4H PRN   magnesium oxide 800 mg PRN   magnesium oxide 800 mg PRN   potassium chloride 10% 40 mEq PRN   potassium chloride 10% 40 mEq PRN   potassium chloride 10% 60 mEq PRN   potassium, sodium phosphates 2 packet PRN   potassium, sodium phosphates 2 packet PRN   potassium, sodium phosphates 2 packet PRN      Today I independently reviewed pertinent medications, lines/drains/airways, imaging, lab results, microbiology results,   Assessment/Plan:     Neuro   * Embolic stroke involving right carotid artery    -s/p tPA  -s/p right ICA web stenting  -s/p left ICA terminus thrombectomy  -- daily  --Plavix 75 mg daily (6/5 loaded 300mg)  -atorvastatin 40mgdaily  -- CT head 6/5 at 1800  right MCA infarct, involving the inferior temporal lobe and inferior parietal lobe, no significant mass effect, hydrocephalus, or hemorrhage.    --PT OT SLP  --stroke work up (including hypercoag panel as outpatient)          Hemineglect     Improved after thrombectomy  Still with facial droop        Acute left hemiparesis    PT/OT consulted        Cardiac   Essential hypertension    SBP goal < 140   cardene gtt  Prn labetalol, hydralazine  Echo pending        Endocrine    accuchecks Q6h with SSI   Hgb A1C 6.1        Hypothyroidism    Continue home synthroid        Other   Tobacco abuse    Hx of vaping  Nicotine patch 14mg daily started            Prophylaxis:  Venous Thromboembolism: mechanical chemical  Stress Ulcer: None  Ventilator Pneumonia: no     Activity Orders          None        Full Code     I have spent 35 min with this patient, with over 50% of this time spent coordinating care and speaking with the family    Francine Roque NP  Neurocritical Care  [facility]

## 2017-06-06 NOTE — PROGRESS NOTES
Pt transported on bed with portable cardiac monitoring by RN and PCT to CT scan. CT scan completed, pt tolerated well. Pt transported back to room 7075 and placed on room cardiac monitoring. Pt and VS stable.

## 2017-06-06 NOTE — SUBJECTIVE & OBJECTIVE
Neurologic Chief Complaint: R ICA web/JOSÉ LUIS/MCA occlusion, treated w stent and aspiration to tici 3    Subjective:     Interval History: Patient is seen for follow-up neurological assessment and treatment recommendations: doing well, no complaints.   24 hours post tpa will discharge patient.     HPI, Past Medical, Family, and Social History remains the same as documented in the initial encounter.     Review of Systems   Constitutional: Negative for fever.   Cardiovascular: Negative for leg swelling.   Neurological: Positive for facial asymmetry. Negative for speech difficulty and weakness.     Scheduled Meds:   aspirin  300 mg Rectal Once    aspirin  325 mg Oral Daily    atorvastatin  40 mg Oral Daily    clopidogrel  75 mg Oral Daily    levothyroxine  125 mcg Oral Before breakfast    nicotine  1 patch Transdermal Daily    polyethylene glycol  17 g Oral Daily    senna-docusate 8.6-50 mg  1 tablet Oral Daily     Continuous Infusions:   PRN Meds:acetaminophen, dextrose 50%, glucagon (human recombinant), hydrALAZINE, insulin aspart, labetalol, magnesium oxide, magnesium oxide, potassium chloride 10%, potassium chloride 10%, potassium chloride 10%, potassium, sodium phosphates, potassium, sodium phosphates, potassium, sodium phosphates    Objective:     Vital Signs (Most Recent):  Temp: 97.9 °F (36.6 °C) (06/06/17 1105)  Pulse: (!) 50 (06/06/17 1200)  Resp: 20 (06/06/17 1200)  BP: (!) 104/59 (06/06/17 1200)  SpO2: 97 % (06/06/17 1200)  BP Location: Left arm    Vital Signs Range (Last 24H):  Temp:  [97.6 °F (36.4 °C)-98.3 °F (36.8 °C)]   Pulse:  [50-88]   Resp:  [16-43]   BP: ()/(48-90)   SpO2:  [97 %-100 %]   BP Location: Left arm    Physical Exam   Constitutional: She is oriented to person, place, and time. She appears well-developed and well-nourished.   HENT:   Head: Normocephalic.   Cardiovascular: Normal rate.    Pulmonary/Chest: Effort normal.   Musculoskeletal: Normal range of motion.   Neurological:  She is alert and oriented to person, place, and time.   Skin: Skin is warm and dry.   Nursing note and vitals reviewed.      Neurological Exam:   LOC: alert and follows requests  Language: No aphasia  Speech: No dysarthria  Orientation: Person, Place, Time  Visual Fields (CN II): Full  EOM (CN III, IV, VI): Full/intact  Facial Movement (CN VII): L facial droop - very mild   Motor*: Arm Left:  Normal (5/5), Leg Left:   Normal (5/5), Arm Right:   Normal (5/5), Leg Right:   Normal (5/5)  Sensation: intact to light touch, temperature and vibration  Tone: Arm-Left: normal; Leg-Left: normal; Arm-Right: normal; Leg-Right: normal    NIH Stroke Scale:    Level of Consciousness: 0 - alert  LOC Questions: 0 - answers both correctly  LOC Commands: 0 - performs both correctly  Best Gaze: 0 - normal  Visual: 0 - no visual loss  Facial Palsy: 1 - minor  Motor Left Arm: 0 - no drift  Motor Right Arm: 0 - no drift  Motor Left Le - no drift  Motor Right Le - no drift  Limb Ataxia: 0 - absent  Sensory: 0 - normal  Best Language: 0 - no aphasia  Dysarthria: 0 - normal articulation  Extinction and Inattention: 0 - no neglect  NIH Stroke Scale Total: 1  Modified Jesus Scale:   Timeline: At discharge  Modified Jesus Score: 1 - no significant disability        Laboratory:  CMP:   Recent Labs  Lab 17  0245   CALCIUM 8.1*   ALBUMIN 3.0*   PROT 6.1      K 3.9   CO2 20*      BUN 11   CREATININE 0.8   ALKPHOS 68   ALT 28   AST 21   BILITOT 0.5     CBC:   Recent Labs  Lab 17  0245   WBC 9.74   RBC 4.30   HGB 13.1   HCT 39.0      MCV 91   MCH 30.5   MCHC 33.6     Lipid Panel:   Recent Labs  Lab 17  1655   CHOL 184   LDLCALC 128.4   HDL 42   TRIG 68     Coagulation:   Recent Labs  Lab 17  1144   INR 1.1   APTT 33.8     Platelet Aggregation Study: No results for input(s): PLTAGG, PLTAGINTERP, PLTAGREGLACO, ADPPLTAGGREG in the last 168 hours.  Hgb A1C:   Recent Labs  Lab 17  1655   HGBA1C  6.1     TSH:   Recent Labs  Lab 06/05/17  1144   TSH <0.02*       Diagnostic Results:  I have personally reviewed:  CT head 6/5/17  Evolving infarct in the right MCA territory, particularly involving the inferior temporal lobe and inferior parietal lobe, no significant mass effect, hydrocephalus, or hemorrhage.    CTA multiphase  6/5/17  Occlusion of the right ICA terminus and right M1 segment with reconstitution of flow distally at the right M2 segment with good collateral formation.    Web in the posteromedial surface of the origin of the right ICA with mild flow limitation.  This is the suspected source of emboli.    Additional findings as above.    This report has been flagged in the [facility] medical record.

## 2017-06-06 NOTE — PT/OT/SLP EVAL
"Occupational Therapy  Evaluation    Aziza Cope   MRN: [id number]   Admitting Diagnosis: Embolic stroke involving right carotid artery    OT Date of Treatment: 06/06/17   OT Start Time: 1450  OT Stop Time: 1518  OT Total Time (min): 28 min    Billable Minutes:  Evaluation 28    Diagnosis: Embolic stroke involving right carotid artery   S/p tPA; R ICA occulusion     Past Medical History:   Diagnosis Date    Hypertension     Hypothyroidism       Past Surgical History:   Procedure Laterality Date    THYROIDECTOMY       Referring physician: Mya   Date referred to OT: 6/6/2017    General Precautions: Standard, aspiration, fall  Orthopedic Precautions: N/A  Braces: N/A    Patient History:  Living Environment  Transportation Available: family or friend will provide ; Pt is still driving  Living Environment Comment: Pt lives with spouse and adult son and his fiance in 2  with threshold to enter. Bed/bath on 2nd floor with ~30 stairs and R handrail ascedning. Home has walk in shower with threshold; no built in seat. Bath tub with grab bar. Prior to admit, pt was active and indep and required no DME. She is still driving and cares for multiple small children daily. Spouse works off shore and is home currently and able to assist as needed until he is called offshore again. She reported that she has bi-focals but does not wear them.  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  Pt's spouse and son present throughout. Pt reported "I'm probably just going to " "drive anyway when I leave here"  Chief Complaint: per family "This just isn't her. She normally doesn't talk like this. She is a quiet person"  Patient/Family stated goals: "For her to be herself" ; per pt "I'm just ready to go home"    Pain/Comfort  Pain Rating 1:  (c/o HA and dizzy like feeling when she moves)  Pain Addressed 1: Reposition, Distraction  Pain Rating Post-Intervention 1:  (0 in resting sitting)    Objective:  Patient found with: telemetry, peripheral IV, pulse ox (continuous), blood pressure cuff    Cognitive Exam:  Oriented to: Person, Place and Time  Follows Commands/attention: Follows two-step commands; impulsive with multi-step commands; requires moderate re-direction to task  Communication: clear/fluent  Memory:  No Deficits noted  Safety awareness/insight to disability: impaired  Coping skills/emotional control: Flat    Visual/perceptual:  Intact  tracking; impaired visual spatial skills on clock draw x2 attempts    Physical Exam:  Postural examination/scapula alignment: Rounded shoulder, Head forward and Posterior pelvic tilt  Skin integrity: Visible skin intact  Edema: None noted     Sensation:   Intact  light/touch B UE    Upper Extremity Range of Motion:  Right Upper Extremity: WFL  Left Upper Extremity: WFL    Upper Extremity Strength:  Right Upper Extremity: WFL  Left Upper Extremity: Deficits: 4+/5 shoulder; 5/5 bicep/tricep/wrist   Strength: 5/5 B    Fine motor coordination:   Impaired  Left hand, finger to nose dysmetria    Gross motor coordination: WFL    Functional Mobility:  Bed Mobility: Not completed; Pt sitting up in chair upon arrival     Transfers:  Sit <> Stand Assistance: Contact Guard Assistance  Sit <> Stand Assistive Device: No Assistive Device  Bed <> Chair Technique: Stand Pivot  Bed <> Chair Transfer Assistance: Contact Guard Assistance (hand held assistance)  Bed <> Chair Assistive Device: No Assistive Device    Functional Ambulation: Hand held assistance and CGA " "for short house hold distances    Activities of Daily Living:  Feeding Level of Assistance: Activity did not occur  UE Dressing Level of Assistance: Stand by assistance, Set-up Assistance  LE Dressing Level of Assistance: Set-up Assistance, Contact guard  Grooming Position: Standing  Grooming Level of Assistance: Contact guard assistance    Balance:   Dynamic stand: Min(A); for standing to complete cross crawls (R hand to L knee, etc); standing and turning head and shaking head yes   *required rest break 2/2 fatigue and dizziness    Additional:  -Pt alert and agreeable to therapy session; Edu on OT role in care  -Edu patient and family on signs and symptoms of stroke  -Pt completed clock draw x2 attempts with impulsivity and impaired visual spatial skills noted; fair+ executive functioning  -Pt able to state 12/12 months of year (rep first 5 before stopping); and 7/7 days of week in descending order  -Communication board updated; Pt and family edu on need for staff assistance for up to bathroom/need to be up to EOB for meals    AM-PAC 6 CLICK ADL  How much help from another person does this patient currently need?  1 = Unable, Total/Dependent Assistance  2 = A lot, Maximum/Moderate Assistance  3 = A little, Minimum/Contact Guard/Supervision  4 = None, Modified [place]/Independent    Putting on and taking off regular lower body clothing? : 4  Bathing (including washing, rinsing, drying)?: 3  Toileting, which includes using toilet, bedpan, or urinal? : 3  Putting on and taking off regular upper body clothing?: 4  Taking care of personal grooming such as brushing teeth?: 4  Eating meals?: 4  Total Score: 22    AM-PAC Raw Score CMS "G-Code Modifier Level of Impairment Assistance   6 % Total / Unable   7 - 9 CM 80 - 100% Maximal Assist   10-14 CL 60 - 80% Moderate Assist   15 - 19 CK 40 - 60% Moderate Assist   20 - 22 CJ 20 - 40% Minimal Assist   23 CI 1-20% SBA / CGA   24 CH 0% Independent/ Mod I "       Patient left up in chair with all lines intact, call button in reach, RN notified and family present    Assessment:  Aziza Cope is a 42 y.o. female with a medical diagnosis of Embolic stroke involving right carotid artery and R ICA occulusion s/p tPA; s/p thrombectomy; s/p carotid stent placment. She demo impaired coordination for L UE, impaired visual spatial skills, decline in endurance for dynamic standing, impaired cognition and decline in safety awareness with impulsivity. She demo c/o lightheaded and dizzy feeling with movement. Her current functional status interferes with her role as mother, grandmother, spouse and care taker to self. She will cont to benefit from skilled OT services at this time.     Rehab identified problem list/impairments: Rehab identified problem list/impairments: impaired endurance, impaired sensation, impaired self care skills, impaired balance, gait instability, impaired cognition, decreased safety awareness, pain, impaired coordination    Rehab potential is good.    Activity tolerance: Good    Discharge recommendations: Discharge Facility/Level Of Care Needs: outpatient OT     Barriers to discharge: Barriers to Discharge:  (safety concerns)    Equipment recommendations: shower chair     GOALS:    Occupational Therapy Goals        Problem: Occupational Therapy Goal    Goal Priority Disciplines Outcome Interventions   Occupational Therapy Goal     OT, PT/OT Ongoing (interventions implemented as appropriate)    Description:  Goals to be met by: 6/13     Patient will increase functional independence with ADLs by performing:    Grooming while standing at sink with Set-up Assistance and Supervision.  Supine to sit with Supervision (HOB flat/no rails).  Stand pivot transfers with Supervision.  Complete 3-4 step commands for functional task with 0 added cues.   Pt/CG verbalized understanding for s/s of stroke.                       PLAN:  Patient to be seen 6 x/week to address  the above listed problems via self-care/home management, therapeutic activities, therapeutic exercises, neuromuscular re-education, cognitive retraining  Plan of Care expires: 07/06/17  Plan of Care reviewed with: patient, spouse, son    Neida BRUNSON CORDELIA Ríos  06/06/2017

## 2017-06-06 NOTE — PROGRESS NOTES
Transported to patient room 742. Report given to Dominga STEVEN. Family took all patient belongings to new room. Patient brought in wheelchair with chart. manish Jaramillo.

## 2017-06-06 NOTE — PLAN OF CARE
Problem: Occupational Therapy Goal  Goal: Occupational Therapy Goal  Goals to be met by: 6/13     Patient will increase functional independence with ADLs by performing:    Grooming while standing at sink with Set-up Assistance and Supervision.  Supine to sit with Supervision (HOB flat/no rails).  Stand pivot transfers with Supervision.  Complete 3-4 step commands for functional task with 0 added cues.   Pt/CG verbalized understanding for s/s of stroke.     Outcome: Ongoing (interventions implemented as appropriate)  Pt with safety concerns 2/2 impulsivity and cognitive deficits. She would benefit from SLP consult. Rec outpatient therapy services at this time. CORDELIA Hernandez 6/6/2017

## 2017-06-06 NOTE — PT/OT/SLP EVAL
"Physical Therapy  Evaluation/Treatment    Aziza Cope   MRN: [id number]   Admitting Diagnosis: Embolic stroke involving right carotid artery    PT Received On: 06/06/17  PT Start Time: 1635     PT Stop Time: 1703    PT Total Time (min): 28 min       Billable Minutes:  Evaluation 15 and Therapeutic Activity 13    Diagnosis: Embolic stroke involving right carotid artery; s/p tPA; s/p thrombectomy; s/p carotid stent placment    Past Medical History:   Diagnosis Date    Hypertension     Hypothyroidism       Past Surgical History:   Procedure Laterality Date    THYROIDECTOMY       Referring physician: Kehinde  Date referred to PT: 6/6/2017    General Precautions: Standard, aspiration, fall    Do you have any cultural, spiritual, Christian conflicts, given your current situation?: None noted    Patient History:  Lives With: spouse, child(meredith), adult  Living Arrangements: house  Home Accessibility: stairs to enter home  Living Environment Comment: Pt lives with spouse and adult son and his fiance in 2SH with threshold entry. Pt has full flight of stairs to bedroom with R handrail. PTA pt was (I) with mobility and ADLs; driving and providing are to multiple small children. Pt's spouse works off shore; he is currently home and able to assist upon D/C. Pt's only hobby is spending time with/caring for children.  Equipment Currently Used at Home: none  DME owned (not currently used): none    Previous Level of Function: Independent    Subjective:  Communicated with RN (Rosita) prior to session. RN present for hallway amb with monitor.    Chief Complaint: "I don't need help with that. I can do it myself." pt states  Patient goals: "I just want to go home and be normal again."    Pain/Comfort  Pain Rating 1: 0/10  Pain Rating Post-Intervention 1: 0/10    Objective:   Patient found with: telemetry, pulse ox (continuous), blood pressure cuff     Cognitive Exam:  Oriented to: Person, Place and Situation    Follows " Commands/attention: Follows multistep  commands  Communication: clear/fluent  Safety awareness/insight to disability: serverly impaired    Physical Exam:  Postural examination/scapula alignment: Rounded shoulder and Head forward    Skin integrity: Visible skin intact  Edema: None noted    Sensation:   Intact - upon evaluation and per pt report, no changes in sensation to B LE.    Lower Extremity Range of Motion:  Right Lower Extremity: WNL  Left Lower Extremity: WNL    Lower Extremity Strength: No focal deficits  Right Lower Extremity: WNL  Left Lower Extremity: WNL     Gross motor coordination: WFL    Functional Mobility:  Bed Mobility: Pt found and remained UIC    Transfers:  Sit <> Stand Assistance: Stand By Assistance (from bedside chair; from toilet)  Sit <> Stand Assistive Device: No Assistive Device  Bed <> Chair Technique: Stand Pivot  Bed <> Chair Assistance: Stand By Assistance  Bed <> Chair Assistive Device: No Assistive Device    Gait:   Gait Distance: Pt ambulated x120 feet in hallway setting with few distractions; RN with monitor in tow. Pt able to ambulate with no LOB and ability to maintain conversation throughout. Pt demo no LOB and with improved safety awareness.   Assistance 1: Stand by Assistance, Contact Guard Assistance  Gait Assistive Device: No device  Gait Pattern: reciprocal  Gait Deviation(s):  (none)    Balance:   Static Sit: GOOD-: Takes MODERATE challenges from all directions but inconsistently  Dynamic Sit: GOOD-: Maintains balance through MODERATE excursions of active trunk movement,     Static Stand: FAIR: Maintains without assist but unable to take challenges  Dynamic stand: FAIR+: Needs CLOSE SUPERVISION during gait and is able to right self with minor LOB    Therapeutic Activities and Exercises:  PT arrived to pt's room to find pt sitting UIC with family in room; family agreeable to step out for PT assessment. PT provided pt with second gown for backside. Pt then doffing front  gown; req redirection to leave gowns intact. Pt came to standing ambulated into hallway as above. Upon return to room, pt able to maintain static standing balance for conversation regarding current deficits and poor insight to deficits.     Pt's family returned to room; PT reviewed pt's performance and current supervision needs. Family in agreement, however pt with poor carryover of recommendations made by PT and OT this date. PT spoke with pt's spouse outside of room regarding recs for safe return home and need for close supervision. PT provided strategies for interaction with pt to avoid conflict. Questions/concerns addressed within PT scope of practice.     AM-PAC 6 CLICK MOBILITY  How much help from another person does this patient currently need?   1 = Unable, Total/Dependent Assistance  2 = A lot, Maximum/Moderate Assistance  3 = A little, Minimum/Contact Guard/Supervision  4 = None, Modified [place]/Independent    Turning over in bed (including adjusting bedclothes, sheets and blankets)?: 4  Sitting down on and standing up from a chair with arms (e.g., wheelchair, bedside commode, etc.): 4  Moving from lying on back to sitting on the side of the bed?: 4  Moving to and from a bed to a chair (including a wheelchair)?: 4  Need to walk in hospital room?: 3  Climbing 3-5 steps with a railing?: 3  Total Score: 22     AM-PAC Raw Score CMS G-Code Modifier Level of Impairment Assistance   6 % Total / Unable   7 - 9 CM 80 - 100% Maximal Assist   10 - 14 CL 60 - 80% Moderate Assist   15 - 19 CK 40 - 60% Moderate Assist   20 - 22 CJ 20 - 40% Minimal Assist   23 CI 1-20% SBA / CGA   24 CH 0% Independent/ Mod I     Patient left up in chair with all lines intact, call button in reach, RN notified and family present.    Assessment:  Aziza Cope is a 42 y.o. female with a medical diagnosis of Embolic stroke involving right carotid artery; s/p tPA; s/p thrombectomy; s/p carotid stent placement. PTA pt was (I)  with ADLs and mobility. Upon evaluation, pt presents with impulsivity, impaired cognition, and poor insight to deficits. Pt current req supervision-CGA for majority of mobility. Will assess stairs in follow up session. Patient will benefit from continued PT services to address the above and below impairments.    Rehab identified problem list/impairments: Rehab identified problem list/impairments: impaired cognition, impaired balance, decreased coordination, decreased safety awareness    Rehab potential is good.    Activity tolerance: Good    Discharge recommendations: Discharge Facility/Level Of Care Needs: outpatient PT     Barriers to discharge: Barriers to Discharge:  (safety concerns related to poor insight of deficits)    Equipment recommendations: Equipment Needed After Discharge: shower chair     GOALS:    Physical Therapy Goals        Problem: Physical Therapy Goal    Goal Priority Disciplines Outcome Goal Variances Interventions   Physical Therapy Goal     PT/OT, PT Ongoing (interventions implemented as appropriate)     Description:  Goals to be met by: 2017     Patient will increase functional independence with mobility by performin. Supine to sit with [place]  2. Sit to supine with [place]  3. Sit to stand transfer with [place]  4. Bed to chair transfer with [place]  5. Gait x150 feet with Supervision while performing cognitively demanding tasks  6. Ascend/descend x1 flight of stairs with right Handrails Stand-by Assistance  7. Stand for x10 minutes with Stand-by Assistance while performing dynamic balance tasks  8. Patient and family will be (I) with stroke signs/symptoms (FAST acronym)                    PLAN:    Patient to be seen 6 x/week to address the above listed problems via gait training, therapeutic activities, therapeutic exercises, neuromuscular re-education  Plan of Care expires: 17  Plan of Care reviewed with: patient, spouse, family    Dara NICOLE  Meri, PT, DPT  [phone]  6/6/2017

## 2017-06-06 NOTE — PLAN OF CARE
Problem: Physical Therapy Goal  Goal: Physical Therapy Goal  Goals to be met by: 2017     Patient will increase functional independence with mobility by performin. Supine to sit with [place]  2. Sit to supine with [place]  3. Sit to stand transfer with [place]  4. Bed to chair transfer with [place]  5. Gait x150 feet with Supervision while performing cognitively demanding tasks  6. Ascend/descend x1 flight of stairs with right Handrails Stand-by Assistance  7. Stand for x10 minutes with Stand-by Assistance while performing dynamic balance tasks  8. Patient and family will be (I) with stroke signs/symptoms (FAST acronym)    Outcome: Ongoing (interventions implemented as appropriate)    PT Evaluation complete. Recommending OP PT upon D/C.    Dara Jerez, PT, DPT  [phone]  2017

## 2017-06-07 VITALS
WEIGHT: 200 LBS | DIASTOLIC BLOOD PRESSURE: 60 MMHG | TEMPERATURE: 98 F | SYSTOLIC BLOOD PRESSURE: 120 MMHG | RESPIRATION RATE: 19 BRPM | OXYGEN SATURATION: 98 % | HEIGHT: 63 IN | BODY MASS INDEX: 35.44 KG/M2 | HEART RATE: 72 BPM

## 2017-06-07 LAB
ALBUMIN SERPL BCP-MCNC: 3.1 G/DL
ALP SERPL-CCNC: 70 U/L
ALT SERPL W/O P-5'-P-CCNC: 29 U/L
ANION GAP SERPL CALC-SCNC: 8 MMOL/L
AST SERPL-CCNC: 21 U/L
BASOPHILS # BLD AUTO: 0.01 K/UL
BASOPHILS NFR BLD: 0.1 %
BILIRUB SERPL-MCNC: 0.5 MG/DL
BUN SERPL-MCNC: 15 MG/DL
CALCIUM SERPL-MCNC: 8.3 MG/DL
CHLORIDE SERPL-SCNC: 110 MMOL/L
CO2 SERPL-SCNC: 21 MMOL/L
CREAT SERPL-MCNC: 0.8 MG/DL
DIASTOLIC DYSFUNCTION: NO
DIFFERENTIAL METHOD: NORMAL
EOSINOPHIL # BLD AUTO: 0.2 K/UL
EOSINOPHIL NFR BLD: 2.3 %
ERYTHROCYTE [DISTWIDTH] IN BLOOD BY AUTOMATED COUNT: 13.9 %
EST. GFR  (AFRICAN AMERICAN): >60 ML/MIN/1.73 M^2
EST. GFR  (NON AFRICAN AMERICAN): >60 ML/MIN/1.73 M^2
ESTIMATED PA SYSTOLIC PRESSURE: 28.4
GLUCOSE SERPL-MCNC: 119 MG/DL
HCT VFR BLD AUTO: 38.5 %
HGB BLD-MCNC: 12.8 G/DL
LYMPHOCYTES # BLD AUTO: 2.6 K/UL
LYMPHOCYTES NFR BLD: 29.7 %
MAGNESIUM SERPL-MCNC: 1.9 MG/DL
MCH RBC QN AUTO: 30.3 PG
MCHC RBC AUTO-ENTMCNC: 33.2 %
MCV RBC AUTO: 91 FL
MITRAL VALVE REGURGITATION: NORMAL
MONOCYTES # BLD AUTO: 0.6 K/UL
MONOCYTES NFR BLD: 6.7 %
NEUTROPHILS # BLD AUTO: 5.3 K/UL
NEUTROPHILS NFR BLD: 60.9 %
PA AA PRP-ACNC: 474 ARU
PHOSPHATE SERPL-MCNC: 4.1 MG/DL
PLATELET # BLD AUTO: 237 K/UL
PLATELET RESPONSE PLAVIX: 193 PRU
PMV BLD AUTO: 10.3 FL
POCT GLUCOSE: 129 MG/DL (ref 70–110)
POCT GLUCOSE: 97 MG/DL (ref 70–110)
POTASSIUM SERPL-SCNC: 4.1 MMOL/L
PROT SERPL-MCNC: 6 G/DL
RBC # BLD AUTO: 4.22 M/UL
RETIRED EF AND QEF - SEE NOTES: 60 (ref 55–65)
SODIUM SERPL-SCNC: 139 MMOL/L
TRICUSPID VALVE REGURGITATION: NORMAL
WBC # BLD AUTO: 8.63 K/UL

## 2017-06-07 PROCEDURE — G8996 SWALLOW CURRENT STATUS: HCPCS | Mod: CH

## 2017-06-07 PROCEDURE — G8997 SWALLOW GOAL STATUS: HCPCS | Mod: CH

## 2017-06-07 PROCEDURE — 97535 SELF CARE MNGMENT TRAINING: CPT

## 2017-06-07 PROCEDURE — 93306 TTE W/DOPPLER COMPLETE: CPT

## 2017-06-07 PROCEDURE — 25000003 PHARM REV CODE 250: Performed by: NURSE PRACTITIONER

## 2017-06-07 PROCEDURE — 93306 TTE W/DOPPLER COMPLETE: CPT | Mod: 26,,, | Performed by: INTERNAL MEDICINE

## 2017-06-07 PROCEDURE — 80053 COMPREHEN METABOLIC PANEL: CPT

## 2017-06-07 PROCEDURE — 85025 COMPLETE CBC W/AUTO DIFF WBC: CPT

## 2017-06-07 PROCEDURE — G8998 SWALLOW D/C STATUS: HCPCS | Mod: CH

## 2017-06-07 PROCEDURE — 92610 EVALUATE SWALLOWING FUNCTION: CPT

## 2017-06-07 PROCEDURE — 99233 SBSQ HOSP IP/OBS HIGH 50: CPT | Mod: ,,, | Performed by: PSYCHIATRY & NEUROLOGY

## 2017-06-07 PROCEDURE — 97532 *HC OT COG SKL DEV EA 15: CPT

## 2017-06-07 PROCEDURE — 97530 THERAPEUTIC ACTIVITIES: CPT

## 2017-06-07 PROCEDURE — 85576 BLOOD PLATELET AGGREGATION: CPT

## 2017-06-07 PROCEDURE — 25000003 PHARM REV CODE 250: Performed by: PHYSICIAN ASSISTANT

## 2017-06-07 PROCEDURE — 83735 ASSAY OF MAGNESIUM: CPT

## 2017-06-07 PROCEDURE — 92523 SPEECH SOUND LANG COMPREHEN: CPT

## 2017-06-07 PROCEDURE — 84100 ASSAY OF PHOSPHORUS: CPT

## 2017-06-07 PROCEDURE — 85576 BLOOD PLATELET AGGREGATION: CPT | Mod: 91

## 2017-06-07 PROCEDURE — 97112 NEUROMUSCULAR REEDUCATION: CPT

## 2017-06-07 PROCEDURE — 36415 COLL VENOUS BLD VENIPUNCTURE: CPT

## 2017-06-07 RX ORDER — NICOTINE 7MG/24HR
1 PATCH, TRANSDERMAL 24 HOURS TRANSDERMAL DAILY
Qty: 14 PATCH | Refills: 0 | COMMUNITY
Start: 2017-06-07 | End: 2022-09-04

## 2017-06-07 RX ORDER — ATORVASTATIN CALCIUM 40 MG/1
40 TABLET, FILM COATED ORAL DAILY
Qty: 90 TABLET | Refills: 0 | Status: SHIPPED | OUTPATIENT
Start: 2017-06-07 | End: 2017-10-04

## 2017-06-07 RX ORDER — IBUPROFEN 200 MG
1 TABLET ORAL DAILY
Qty: 14 PATCH | Refills: 0 | Status: SHIPPED | OUTPATIENT
Start: 2017-06-07 | End: 2022-09-04

## 2017-06-07 RX ORDER — CLOPIDOGREL BISULFATE 75 MG/1
75 TABLET ORAL DAILY
Qty: 90 TABLET | Refills: 0 | Status: SHIPPED | OUTPATIENT
Start: 2017-06-07 | End: 2017-10-04

## 2017-06-07 RX ORDER — ASPIRIN 325 MG
325 TABLET ORAL DAILY
Refills: 0 | COMMUNITY
Start: 2017-06-07 | End: 2017-11-15 | Stop reason: HOSPADM

## 2017-06-07 RX ADMIN — ACETAMINOPHEN 650 MG: 325 TABLET ORAL at 03:06

## 2017-06-07 RX ADMIN — NICOTINE 1 PATCH: 14 PATCH, EXTENDED RELEASE TRANSDERMAL at 09:06

## 2017-06-07 RX ADMIN — ATORVASTATIN CALCIUM 40 MG: 20 TABLET, FILM COATED ORAL at 08:06

## 2017-06-07 RX ADMIN — STANDARDIZED SENNA CONCENTRATE AND DOCUSATE SODIUM 1 TABLET: 8.6; 5 TABLET, FILM COATED ORAL at 08:06

## 2017-06-07 RX ADMIN — ASPIRIN 325 MG ORAL TABLET 325 MG: 325 PILL ORAL at 08:06

## 2017-06-07 RX ADMIN — LEVOTHYROXINE SODIUM 125 MCG: 125 TABLET ORAL at 06:06

## 2017-06-07 RX ADMIN — CLOPIDOGREL 75 MG: 75 TABLET, FILM COATED ORAL at 08:06

## 2017-06-07 RX ADMIN — ACETAMINOPHEN 650 MG: 325 TABLET ORAL at 11:06

## 2017-06-07 NOTE — PT/OT/SLP PROGRESS
"Occupational Therapy  Treatment    Aziza Cope   MRN: [id number]   Admitting Diagnosis: Embolic stroke involving right carotid artery    OT Date of Treatment: 17   OT Start Time: 925  OT Stop Time: 955  OT Total Time (min): 30 min    Billable Minutes:  Self Care/Home Management 10 and Cognitive Retraining 20    General Precautions: Standard, aspiration, fall  Orthopedic Precautions: N/A  Braces: N/A  Spiritual Beliefs:  Taoist    Subjective:  Communicated with nurse prior to session.  Patient:  "I feel good."  "I can't drive; I know that."  :  "She is doing a lot better today."  "Do you know when the impulsiveness will get better?"  Pain/Comfort  Pain Rating 1: 0/10  Pain Rating Post-Intervention 1: 0/10    Objective:  Patient found with: telemetry   present.    Functional Mobility:  Bed Mobility:  Rolling/Turning to Left: Independent  Rolling/Turning Right: Independent  Scooting/Bridging: Independent  Supine to Sit: Independent  Sit to Supine: Independent    Transfers:   Sit <> Stand Assistance: Modified Independent  Sit <> Stand Assistive Device: No Assistive Device  Bed <> Chair Technique: Stand Pivot  Bed <> Chair Transfer Assistance: Modified Independent    Activities of Daily Living:  UE Dressing Level of Assistance: Modified independent  LE Dressing Level of Assistance: Modified independent  Grooming Position: Standing  Grooming Level of Assistance: Modified independent  Toileting Level of Assistance: Modified independent    Additional Treatment:   Patient education provided on role of OT. Patient verbalizing understanding; continued education, patient/ family training recommended.  Patient's functional status and disposition recommendation discussed with stroke team in daily rounds.  White board updated in patient's room.  OT asked if there were any other questions; patient/ family had no further questions.    MOCA:   Visuospatial/ Executive:  4/5  Namin/3  Attention:  " "3/6  Language:  3/3  Abstraction: 2/2  Delayed Recall:  3/5  Orientation:  6/6    AM-PAC 6 CLICK ADL   How much help from another person does this patient currently need?   1 = Unable, Total/Dependent Assistance  2 = A lot, Maximum/Moderate Assistance  3 = A little, Minimum/Contact Guard/Supervision  4 = None, Modified [place]/Independent    Putting on and taking off regular lower body clothing? : 4  Bathing (including washing, rinsing, drying)?: 4  Toileting, which includes using toilet, bedpan, or urinal? : 4  Putting on and taking off regular upper body clothing?: 4  Taking care of personal grooming such as brushing teeth?: 4  Eating meals?: 4  Total Score: 24     AM-PAC Raw Score CMS "G-Code Modifier Level of Impairment Assistance   6 % Total / Unable   7 - 8 CM 80 - 100% Maximal Assist   9-13 CL 60 - 80% Moderate Assist   14 - 19 CK 40 - 60% Moderate Assist   20 - 22 CJ 20 - 40% Minimal Assist   23 CI 1-20% SBA / CGA   24 CH 0% Independent/ Mod I       Patient left supine with all lines intact and call button in reach    ASSESSMENT:  Aziza Cope is a 42 y.o. female with a medical diagnosis of Embolic stroke involving right carotid artery and presents with performance deficits of performance deficits of cognitive skills including impaired attention and memory all resulting in inability organizing occupational performance in a timely and safe manner.  These performance deficits have resulted in activity limitations including but not limited to:  balancing checkbook, shopping, meal preparation,  and assisting grandchildren.   Patient's role as wife, mother, grandmother,  and independent caretaker for self has been affected. Patient will benefit from skilled OT services to maximize level of independence with self-care skills and functional mobility.         Rehab identified problem list/impairments: Rehab identified problem list/impairments: impaired cognition    Rehab potential is " good.    Activity tolerance: Good    Discharge recommendations: Discharge Facility/Level Of Care Needs: outpatient OT     Barriers to discharge: Barriers to Discharge: None    Equipment recommendations: none     GOALS:    Occupational Therapy Goals        Problem: Occupational Therapy Goal    Goal Priority Disciplines Outcome Interventions   Occupational Therapy Goal     OT, PT/OT Ongoing (interventions implemented as appropriate)    Description:  Goals to be met by: 6/13     Patient will increase functional independence with ADLs by performing:    Grooming while standing at sink with Set-up Assistance and Supervision. Met  Supine to sit with Supervision (HOB flat/no rails).  Met  Stand pivot transfers with Supervision.  Met  Complete 3-4 step commands for functional task with 0 added cues.   Pt/CG verbalized understanding for s/s of stroke.                        Plan:  Patient to be seen 5 x/week to address the above listed problems via cognitive retraining  Plan of Care expires: 07/05/17  Plan of Care reviewed with: patient         CORDELIA Villanueva  06/07/2017

## 2017-06-07 NOTE — SUBJECTIVE & OBJECTIVE
Neurologic Chief Complaint: R ICA web/JOSÉ LUIS/MCA occlusion, treated w stent and aspiration to tici 3    Subjective:     Interval History: Patient is seen for follow-up neurological assessment and treatment recommendations:   SABINE. Patient stepped down to NSU as she was not ready for discharge yesterday per PT/OT recs. Will discharge patient today after ECHO.     HPI, Past Medical, Family, and Social History remains the same as documented in the initial encounter.     Review of Systems   Constitutional: Negative for fever.   Cardiovascular: Negative for leg swelling.   Neurological: Positive for facial asymmetry. Negative for speech difficulty and weakness.     Scheduled Meds:   aspirin  325 mg Oral Daily    atorvastatin  40 mg Oral Daily    clopidogrel  75 mg Oral Daily    enoxaparin  40 mg Subcutaneous Daily    levothyroxine  125 mcg Oral Before breakfast    nicotine  1 patch Transdermal Daily    polyethylene glycol  17 g Oral Daily    senna-docusate 8.6-50 mg  1 tablet Oral Daily     Continuous Infusions:   PRN Meds:acetaminophen, dextrose 50%, glucagon (human recombinant), insulin aspart, labetalol    Objective:     Vital Signs (Most Recent):  Temp: 97.9 °F (36.6 °C) (06/07/17 0705)  Pulse: 74 (06/07/17 0705)  Resp: 18 (06/07/17 0705)  BP: 117/67 (06/07/17 0705)  SpO2: 98 % (06/07/17 0705)  BP Location: Right arm    Vital Signs Range (Last 24H):  Temp:  [97.2 °F (36.2 °C)-99.2 °F (37.3 °C)]   Pulse:  [50-90]   Resp:  [18-44]   BP: ()/(48-75)   SpO2:  [95 %-100 %]   BP Location: Right arm    Physical Exam   Constitutional: She is oriented to person, place, and time. She appears well-developed and well-nourished.   HENT:   Head: Normocephalic.   Eyes: EOM are normal.   Neck: Normal range of motion. Neck supple.   Cardiovascular: Normal rate.    Pulmonary/Chest: Effort normal.   Musculoskeletal: Normal range of motion.   Neurological: She is alert and oriented to person, place, and time.   Skin: Skin is  warm and dry.   Psychiatric: She has a normal mood and affect.   Nursing note and vitals reviewed.      Neurological Exam:   LOC: alert and follows requests  Language: No aphasia  Speech: No dysarthria  Orientation: Person, Place, Time  Visual Fields (CN II): Full  EOM (CN III, IV, VI): Full/intact  Facial Movement (CN VII): L facial droop - very mild   Motor*: Arm Left:  Normal (5/5), Leg Left:   Normal (5/5), Arm Right:   Normal (5/5), Leg Right:   Normal (5/5)  Sensation: intact to light touch, temperature and vibration  Tone: Arm-Left: normal; Leg-Left: normal; Arm-Right: normal; Leg-Right: normal    NIH Stroke Scale:    Level of Consciousness: 0 - alert  LOC Questions: 0 - answers both correctly  LOC Commands: 0 - performs both correctly  Best Gaze: 0 - normal  Visual: 0 - no visual loss  Facial Palsy: 1 - minor  Motor Left Arm: 0 - no drift  Motor Right Arm: 0 - no drift  Motor Left Le - no drift  Motor Right Le - no drift  Limb Ataxia: 0 - absent  Sensory: 0 - normal  Best Language: 0 - no aphasia  Dysarthria: 0 - normal articulation  Extinction and Inattention: 0 - no neglect  NIH Stroke Scale Total: 1      Laboratory:  CMP:     Recent Labs  Lab 17  0545   CALCIUM 8.3*   ALBUMIN 3.1*   PROT 6.0      K 4.1   CO2 21*      BUN 15   CREATININE 0.8   ALKPHOS 70   ALT 29   AST 21   BILITOT 0.5     CBC:     Recent Labs  Lab 17  0545   WBC 8.63   RBC 4.22   HGB 12.8   HCT 38.5      MCV 91   MCH 30.3   MCHC 33.2     Lipid Panel:     Recent Labs  Lab 17  1655   CHOL 184   LDLCALC 128.4   HDL 42   TRIG 68     Coagulation:     Recent Labs  Lab 17  1144   INR 1.1   APTT 33.8     Platelet Aggregation Study: No results for input(s): PLTAGG, PLTAGINTERP, PLTAGREGLACO, ADPPLTAGGREG in the last 168 hours.  Hgb A1C:     Recent Labs  Lab 17  1655   HGBA1C 6.1     TSH:     Recent Labs  Lab 17  1144   TSH <0.02*       Diagnostic Results:  I have personally  reviewed:  CTH 6/7/17:   Evolving post limb of R IC and medial right temporal lobe infarctions.     CT head 6/5/17  Evolving infarct in the right MCA territory, particularly involving the inferior temporal lobe and inferior parietal lobe, no significant mass effect, hydrocephalus, or hemorrhage.    CTA multiphase  6/5/17  Occlusion of the right ICA terminus and right M1 segment with reconstitution of flow distally at the right M2 segment with good collateral formation.    Web in the posteromedial surface of the origin of the right ICA with mild flow limitation.  This is the suspected source of emboli.    Additional findings as above.    This report has been flagged in the [facility] medical record.

## 2017-06-07 NOTE — PT/OT/SLP DISCHARGE
Occupational Therapy Discharge Summary    Aziza Cope  MRN: [id number]   Embolic stroke involving right carotid artery   Patient Discharged from acute Occupational Therapy on 6/7.  Please refer to prior OT note dated on 6/7 for functional status.     Assessment:   Patient appropriate for care in another setting.  GOALS:    Occupational Therapy Goals        Problem: Occupational Therapy Goal    Goal Priority Disciplines Outcome Interventions   Occupational Therapy Goal     OT, PT/OT Ongoing (interventions implemented as appropriate)    Description:  Goals to be met by: 6/13     Patient will increase functional independence with ADLs by performing:    Grooming while standing at sink with Set-up Assistance and Supervision. Met  Supine to sit with Supervision (HOB flat/no rails).  Met  Stand pivot transfers with Supervision.  Met  Complete 3-4 step commands for functional task with 0 added cues.   Pt/CG verbalized understanding for s/s of stroke.                      Reasons for Discontinuation of Therapy Services  Transfer to alternate level of care.      Plan:  Patient Discharged to: Home.  CORDELIA Nguyen  6/7/2017

## 2017-06-07 NOTE — PLAN OF CARE
Problem: Physical Therapy Goal  Goal: Physical Therapy Goal  Goals to be met by: 2017     Patient will increase functional independence with mobility by performin. Supine to sit with [place]  2. Sit to supine with [place]  3. Sit to stand transfer with [place]  4. Bed to chair transfer with [place]  5. Gait x150 feet with Supervision while performing cognitively demanding tasks  6. Ascend/descend x1 flight of stairs with right Handrails Stand-by Assistance  7. Stand for x10 minutes with Stand-by Assistance while performing dynamic balance tasks  8. Patient and family will be (I) with stroke signs/symptoms (FAST acronym)     Outcome: Outcome(s) achieved Date Met: 17    Pt has met the above goals to the satisfaction of this PT. No further acute PT needs. D/C Acute PT services 2017. Home with OP PT and support of family.    Dara Jerez, PT, DPT  [phone]  2017

## 2017-06-07 NOTE — HOSPITAL COURSE
Ms. Cope is a 41yo F with a R MCA stroke due to a R carotid web s/p tPA and stent.   Repeat CTH 6/6/17 with evolving right posterior internal capsule infarct and right medial temporal lobe infarct. She is on aspirin and plavix due to stent placement and atorvastatin 40mg (). Ordered response to aspirin and plavix, but may not result before patient discharge so will follow up with as OP. She has recovered well and has mild facial droop. Therapy has evaluated her and found that she has no major deficits physically but she is very impulsive and has poor insight into her subtle deficits so is recommended for OP PT/OT. It is recommended that she not drive until discharged by PT/OT.   She is pending ECHO for discharge.

## 2017-06-07 NOTE — PLAN OF CARE
Problem: Occupational Therapy Goal  Goal: Occupational Therapy Goal  Goals to be met by: 6/13     Patient will increase functional independence with ADLs by performing:    Grooming while standing at sink with Set-up Assistance and Supervision.  Supine to sit with Supervision (HOB flat/no rails).  Stand pivot transfers with Supervision.  Complete 3-4 step commands for functional task with 0 added cues.   Pt/CG verbalized understanding for s/s of stroke.      Goals remain appropriate.  CORDELIA Nguyen  6/7/2017

## 2017-06-07 NOTE — PROGRESS NOTES
[facility]  Vascular Neurology  Comprehensive Stroke Center  Progress Note    Neurologic Chief Complaint: R ICA web/JOSÉ LUIS/MCA occlusion, treated w stent and aspiration to tici 3    Subjective:     Interval History: Patient is seen for follow-up neurological assessment and treatment recommendations:   SABINE. Patient stepped down to NSU as she was not ready for discharge yesterday per PT/OT recs. Will discharge patient today after ECHO.     HPI, Past Medical, Family, and Social History remains the same as documented in the initial encounter.     Review of Systems   Constitutional: Negative for fever.   Cardiovascular: Negative for leg swelling.   Neurological: Positive for facial asymmetry. Negative for speech difficulty and weakness.     Scheduled Meds:   aspirin  325 mg Oral Daily    atorvastatin  40 mg Oral Daily    clopidogrel  75 mg Oral Daily    enoxaparin  40 mg Subcutaneous Daily    levothyroxine  125 mcg Oral Before breakfast    nicotine  1 patch Transdermal Daily    polyethylene glycol  17 g Oral Daily    senna-docusate 8.6-50 mg  1 tablet Oral Daily     Continuous Infusions:   PRN Meds:acetaminophen, dextrose 50%, glucagon (human recombinant), insulin aspart, labetalol    Objective:     Vital Signs (Most Recent):  Temp: 97.9 °F (36.6 °C) (06/07/17 0705)  Pulse: 74 (06/07/17 0705)  Resp: 18 (06/07/17 0705)  BP: 117/67 (06/07/17 0705)  SpO2: 98 % (06/07/17 0705)  BP Location: Right arm    Vital Signs Range (Last 24H):  Temp:  [97.2 °F (36.2 °C)-99.2 °F (37.3 °C)]   Pulse:  [50-90]   Resp:  [18-44]   BP: ()/(48-75)   SpO2:  [95 %-100 %]   BP Location: Right arm    Physical Exam   Constitutional: She is oriented to person, place, and time. She appears well-developed and well-nourished.   HENT:   Head: Normocephalic.   Eyes: EOM are normal.   Neck: Normal range of motion. Neck supple.   Cardiovascular: Normal rate.    Pulmonary/Chest: Effort normal.   Musculoskeletal: Normal  range of motion.   Neurological: She is alert and oriented to person, place, and time.   Skin: Skin is warm and dry.   Psychiatric: She has a normal mood and affect.   Nursing note and vitals reviewed.      Neurological Exam:   LOC: alert and follows requests  Language: No aphasia  Speech: No dysarthria  Orientation: Person, Place, Time  Visual Fields (CN II): Full  EOM (CN III, IV, VI): Full/intact  Facial Movement (CN VII): L facial droop - very mild   Motor*: Arm Left:  Normal (5/5), Leg Left:   Normal (5/5), Arm Right:   Normal (5/5), Leg Right:   Normal (5/5)  Sensation: intact to light touch, temperature and vibration  Tone: Arm-Left: normal; Leg-Left: normal; Arm-Right: normal; Leg-Right: normal    NIH Stroke Scale:    Level of Consciousness: 0 - alert  LOC Questions: 0 - answers both correctly  LOC Commands: 0 - performs both correctly  Best Gaze: 0 - normal  Visual: 0 - no visual loss  Facial Palsy: 1 - minor  Motor Left Arm: 0 - no drift  Motor Right Arm: 0 - no drift  Motor Left Le - no drift  Motor Right Le - no drift  Limb Ataxia: 0 - absent  Sensory: 0 - normal  Best Language: 0 - no aphasia  Dysarthria: 0 - normal articulation  Extinction and Inattention: 0 - no neglect  NIH Stroke Scale Total: 1      Laboratory:  CMP:     Recent Labs  Lab 17  0545   CALCIUM 8.3*   ALBUMIN 3.1*   PROT 6.0      K 4.1   CO2 21*      BUN 15   CREATININE 0.8   ALKPHOS 70   ALT 29   AST 21   BILITOT 0.5     CBC:     Recent Labs  Lab 17  0545   WBC 8.63   RBC 4.22   HGB 12.8   HCT 38.5      MCV 91   MCH 30.3   MCHC 33.2     Lipid Panel:     Recent Labs  Lab 17  1655   CHOL 184   LDLCALC 128.4   HDL 42   TRIG 68     Coagulation:     Recent Labs  Lab 17  1144   INR 1.1   APTT 33.8     Platelet Aggregation Study: No results for input(s): PLTAGG, PLTAGINTERP, PLTAGREGLACO, ADPPLTAGGREG in the last 168 hours.  Hgb A1C:     Recent Labs  Lab 17  1655   HGBA1C 6.1     TSH:      Recent Labs  Lab 06/05/17  1144   TSH <0.02*       Diagnostic Results:  I have personally reviewed:  CTH 6/7/17:   Evolving post limb of R IC and medial right temporal lobe infarctions.     CT head 6/5/17  Evolving infarct in the right MCA territory, particularly involving the inferior temporal lobe and inferior parietal lobe, no significant mass effect, hydrocephalus, or hemorrhage.    CTA multiphase  6/5/17  Occlusion of the right ICA terminus and right M1 segment with reconstitution of flow distally at the right M2 segment with good collateral formation.    Web in the posteromedial surface of the origin of the right ICA with mild flow limitation.  This is the suspected source of emboli.    Additional findings as above.    This report has been flagged in the [facility] medical record.      Assessment/Plan:     Ms. 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   Repeat CTH 6/6/17 with evolving right posterior internal capsule infarct and right medial temporal lobe infarct. She is on aspirin and plavix due to stent placement and atorvastatin 40mg (). Ordered response to aspirin and plavix, but may not result before patient discharge so will follow up with as OP. She has recovered well and has mild facial droop. Therapy has evaluated her and found that she has no major deficits physically but she is very impulsive and has poor insight into her subtle deficits so is recommended for OP PT/OT. It is recommended that she not drive until discharged by PT/OT.   She is pending ECHO for discharge.     Tobacco abuse     cessation         Right Carotid Web s/p Stenting 6/5/17    Will follow up with Dr. Vega outpatient          Hypothyroidism    Continue home dose synthroid        Hemineglect    2/2 stroke  PT/OT        Acute left hemiparesis    Left arm predominant hemiparesis; significantly improved  2/2 stroke  - PT/OT        Essential hypertension    Stroke risk factor  SBP  <140 post thrombectomy; at goal        * Embolic stroke involving right carotid artery    Patient is a 42 yr old female with PMHx of hypothyroidism, HTN who presented with R MCA syndrome. S/p tpa. CT remarkable for hyperdense MCA sign, ASPECT score of 9. IR angiogram remarkable for R carotid T occlusion. R carotid web also seen - thought to be a possible etiology for the stroke in the absence of other known stroke risk factors in this young patient. However, if tele this admission unrevealing for PAF, will need longer term cardiac monitoring to complete workup. 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     Antithrombotics for secondary stroke prevention: ASA and plavix; ordered platelet response to ASA and Plavix - may need to f/u as OP  Statins for secondary stroke prevention and hyperlipidemia, if present: Atorvastatin- 40 mg oral daily  Aggressive risk factor modification: Hypertension  Rehab Efforts:  - eval by pt ot speech. Recommended for OP follow up PT/OT   Diagnostics: Ordered/Pending: ECHO   VTE Prophylaxis: hep vte   BP Parameters: SBP <140 post thrombectomy   Nursing Orders: neuro checks q4 hours, fall precautions               Albertina Chavez PA-C  Comprehensive Stroke Center  Department of Vascular Neurology   [facility]

## 2017-06-07 NOTE — SUBJECTIVE & OBJECTIVE
NIH Stroke Scale:  Interval: 7 days or at discharge (whichever comes first)  Level of Consciousness: 0 - alert  LOC Questions: 0 - answers both correctly  LOC Commands: 0 - performs both correctly  Best Gaze: 0 - normal  Visual: 0 - no visual loss  Facial Palsy: 1 - minor  Motor Left Arm: 0 - no drift  Motor Right Arm: 0 - no drift  Motor Left Le - no drift  Motor Right Le - no drift  Limb Ataxia: 0 - absent  Sensory: 0 - normal  Best Language: 0 - no aphasia  Dysarthria: 0 - normal articulation  Extinction and Inattention: 0 - no neglect  NIH Stroke Scale Total: 1  [place] Coma Scale:  Best Eye Response: 4 - spontaneous  Best Motor Response: 6 - obeys commands  Best Verbal Response: 5 - oriented  [place] Coma Scale Total: 15  Modified [place] Scale:   Timeline: At discharge  Modified [place] Score: 2 - slight disability

## 2017-06-07 NOTE — DISCHARGE SUMMARY
[facility]  Vascular Neurology  Comprehensive Stroke Center  Discharge Summary     Summary:     Admit Date: 6/5/2017  1:34 PM    Discharge Date and Time:  06/07/2017 1:18 PM    Attending Physician: Raz Vega MD     Discharge Provider: Albertina Chavez PA-C    History of Present Illness: 42 y.o. female  with PMHx of HTN, hypothyroidism who presented to [facility] with mild headache and nausea starting yesterday with today having acute onset of left hemiplegia. Last seen normal at 1045 and last heard on phone normal at 1055, family member came back into the house at 1100 to see she had fallen on the couch, noted to have facial droop and left sided weakness. These symptoms have never happened before. There are no identified triggers or modifying factors. There have been no recurrent events. There are no other associated symptoms.  Patient seen via telemedicine - was administered tpa for R MCA syndrome. Tpa infusion was completed at 1:20pm.     Hospital Course (synopsis of major diagnoses, care, treatment, and services provided during the course of the hospital stay): Ms. 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   Repeat CTH 6/6/17 with evolving right posterior internal capsule infarct and right medial temporal lobe infarct. She is on aspirin and plavix due to stent placement and atorvastatin 40mg (). Ordered response to aspirin and plavix, but may not result before patient discharge so will follow up with as OP. She has recovered well and has mild facial droop. Therapy has evaluated her and found that she has no major deficits physically but she is very impulsive and has poor insight into her subtle deficits so is recommended for OP PT/OT. It is recommended that she not drive until discharged by PT/OT.   She is pending ECHO for discharge.     NIH Stroke Scale:  Interval: 7 days or at discharge (whichever comes first)  Level of Consciousness: 0 -  alert  LOC Questions: 0 - answers both correctly  LOC Commands: 0 - performs both correctly  Best Gaze: 0 - normal  Visual: 0 - no visual loss  Facial Palsy: 1 - minor  Motor Left Arm: 0 - no drift  Motor Right Arm: 0 - no drift  Motor Left Le - no drift  Motor Right Le - no drift  Limb Ataxia: 0 - absent  Sensory: 0 - normal  Best Language: 0 - no aphasia  Dysarthria: 0 - normal articulation  Extinction and Inattention: 0 - no neglect  NIH Stroke Scale Total: 1  [place] Coma Scale:  Best Eye Response: 4 - spontaneous  Best Motor Response: 6 - obeys commands  Best Verbal Response: 5 - oriented  [place] Coma Scale Total: 15  Modified Hamlin Scale:   Timeline: At discharge  Modified Hamlin Score: 2 - slight disability            Assessment/Plan:     Interventions: Stenting of R carotid web, Diagnostic Angiography    Complications: None    Research Candidate?:  No    Neurological deficit at discharge: mild facial asymmetry, impulsivity      Disposition:     Final Active Diagnoses:    Diagnosis Date Noted POA    PRINCIPAL PROBLEM:  Embolic stroke involving right carotid artery [I63.131]  Yes    Right Carotid Web s/p Stenting 17 [I77.3] 2017 Yes    Tobacco abuse [Z72.0] 2017 Yes    Hemineglect [R41.4] 2017 Yes    Hypothyroidism [E03.9] 2017 Unknown    Essential hypertension [I10]  Yes    Acute left hemiparesis [G81.94]  Yes      Problems Resolved During this Admission:    Diagnosis Date Noted Date Resolved POA     Right Carotid Web s/p Stenting 17    Will follow up with Dr. Vega outpatient          Hypothyroidism    Continue home dose synthroid        Hemineglect    2/2 stroke  PT/OT        Acute left hemiparesis    Left arm predominant hemiparesis; significantly improved to baseline  2/2 stroke  - PT/OT        Essential hypertension    Stroke risk factor  SBP <140 post thrombectomy; at goal        * Embolic stroke involving right carotid artery    Patient is a 42 yr old  female with PMHx of hypothyroidism, HTN who presented with R MCA syndrome. S/p tpa. CT remarkable for hyperdense MCA sign, ASPECT score of 9. IR angiogram remarkable for R carotid T occlusion. R carotid web also seen - thought to be a possible etiology for the stroke in the absence of other known stroke risk factors in this young patient. However, if tele this admission unrevealing for PAF, will need longer term cardiac monitoring to complete workup. 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     Antithrombotics for secondary stroke prevention: ASA and plavix; ordered platelet response to ASA and Plavix - may need to f/u as OP  Statins for secondary stroke prevention and hyperlipidemia, if present: Atorvastatin- 40 mg oral daily  Aggressive risk factor modification: Hypertension  Rehab Efforts:  - eval by pt ot speech. Recommended for OP follow up PT/OT   Diagnostics: Ordered/Pending: ECHO. Discharge after complete.  VTE Prophylaxis: hep vte   BP Parameters: SBP <140 post thrombectomy   Nursing Orders: neuro checks q4 hours, fall precautions               Recommendations:     Post-discharge complication risks: Falls    Stroke Education given to: patient and family    Follow-up in Stroke Clinic in 4-6 weeks    Discharge Plan:  Antithrombotics: Aspirin 81mg, Clopidogrel 75mg  Statin: Atorvastatin 40mg  Smoking Cessation  Aggresive risk factor modification:  Smoking, High Cholesterol, Diet and Exercise    Follow Up:  Follow-up Information     July Boles PA-C On 7/6/2017.    Specialty:  Neurology  Why:  @ 8:00  Contact information:  [address] [facility] [address]  [phone]             Vinny Coronado MD In 10 days.    Specialty:  Family Medicine  Why:  For hospital follow up.   Contact information:  [address] [place] [id number] [phone]                 Patient Instructions:     Diet general       Medications:  Reconciled Home Medications:   Current Discharge Medication List       START taking these medications    Details   aspirin 325 MG tablet Take 1 tablet (325 mg total) by mouth once daily.  Refills: 0      atorvastatin (LIPITOR) 40 MG tablet Take 1 tablet (40 mg total) by mouth once daily.  Qty: 90 tablet, Refills: 0      clopidogrel (PLAVIX) 75 mg tablet Take 1 tablet (75 mg total) by mouth once daily.  Qty: 90 tablet, Refills: 0      nicotine (NICODERM CQ) 14 mg/24 hr Place 1 patch onto the skin once daily. Use daily for 2 weeks then reduce dose to 7mg patch.  Qty: 14 patch, Refills: 0      nicotine (NICODERM CQ) 7 mg/24 hr Place 1 patch onto the skin once daily. Use 1 patch daily for 2 weeks after finishing 14mg patches.  Qty: 14 patch, Refills: 0         CONTINUE these medications which have NOT CHANGED    Details   levothyroxine (SYNTHROID) 125 MCG tablet Take 125 mcg by mouth once daily.         STOP taking these medications       triamterene-hydrochlorothiazide 37.5-25 mg (MAXZIDE-25) 37.5-25 mg per tablet Comments:   Reason for Stopping:               Albertina Chavez PA-C  Comprehensive Stroke Center  Department of Vascular Neurology   [facility]

## 2017-06-07 NOTE — PLAN OF CARE
Problem: SLP Goal  Goal: SLP Goal    Swallow evaluation and speech language cognitive evaluation completed. No SLP fu rec'd in this setting. Consider HH/OP SLP after DC for high level cognitive functioning.     Rosemarie Rendon M.A. CCC-SLP  Speech Language Pathologist  [phone]  6/7/2017

## 2017-06-07 NOTE — PT/OT/SLP PROGRESS
"Physical Therapy  Treatment/Discharge Summary    Aziza Cope   MRN: [id number]   Admitting Diagnosis: Embolic stroke involving right carotid artery    PT Received On: 06/07/17  PT Start Time: 0858     PT Stop Time: 0922    PT Total Time (min): 24 min       Billable Minutes:  Therapeutic Activity 10 and Neuromuscular Re-education 14    Treatment Type: Treatment  PT/PTA: PT           General Precautions: Standard, aspiration, fall    Subjective:  Communicated with RN (Dominga) prior to session.    "I am already getting better. Do I still need help to walk? Can you tell them I can do it by myself?" pt inquires "I think I could do the stairs today."    Pain/Comfort  Pain Rating 1: 0/10  Location 1: head  Pain Rating Post-Intervention 1: 2/10    Objective:   Patient found with: telemetry    Functional Mobility:  Bed Mobility: Independent    Transfers:  Sit <> Stand Assistance: Independent, Supervision (from EOB x2 trials; from bedside chair x2 trial)  Sit <> Stand Assistive Device: No Assistive Device  Bed <> Chair Technique: Stand Pivot  Bed <> Chair Assistance: Independent  Bed <> Chair Assistive Device: No Assistive Device    Gait:   Gait Distance: x260 feet; no overt LOB despite VCs for head turns in all directions. Appropriate speed and sequencing noted.  Assistance 1: Supervision, Stand by Assistance, Contact Guard Assistance  Gait Assistive Device: No device  Gait Pattern: reciprocal  Gait Deviation(s): (none)    Stairs:   Number of Steps: x1 flight of stairs   Assistance Level: Stand-by Assistance and Contact Guard Assistance   Assistive Device:  No Assistive Device   Hand Rail: R to ascend; L to descend    Stair Pattern: step to; R leg leading to step up; L leg leading to step down; pt encouraged to attempt alternating pattern; noted impaired initial contact of L foot to step    Balance:   Static Sit: GOOD: Takes MODERATE challenges from all directions  Dynamic Sit: GOOD: Maintains balance through MODERATE " excursions of active trunk movement  Static Stand: FAIR+: Takes MINIMAL challenges from all directions  Dynamic stand: FAIR+: Needs CLOSE SUPERVISION during gait and is able to right self with minor LOB     RADER BALANCE SCALE  1.SITTING TO STANDING:  (4) Pt able to stand without using hands and stabilize independently   2. STANDING UNSUPPORTED: (4) Pt able to stand safely 2 minutes   3. SITTING WITH BACK UNSUPPORTED BUT FEET SUPPORTED ON FLOOR: (4) Pt able to sit safely and securely 2 minutes   4. STANDING TO SITTING:(4) Pt sits safely with minimal use of hands   5. TRANSFERS:(4) Pt  able to transfer safely with minor use of hands  6. STANDING UNSUPPORTED WITH EYES CLOSED:(4) Pt  able to stand 10 seconds safely  7. STANDING UNSUPPORTED WITH FEET TOGETHER:(4) Pt   able to place feet together independently and stand 1 minute safely  8. REACHING FORWARD WITH OUTSTRETCHED ARM WHILE STANDING:(3) Pt  can reach forward >12.5 cm safely (5 inches)  9.  OBJECT FROM THE FLOOR FROM A STANDING POSITION:(3) Pt able to  slipper but needs supervision  10. TURNING TO LOOK BEHIND OVER LEFT AND RIGHT SHOULDERS WHILE STANDING:(3) Pt looks behind one side only other side shows less weight shift  11. TURN 360 DEGREES:(4) Pt able to turn 360 degrees safely in 4 seconds or less  12. PLACING ALTERNATE FOOT ON STEP OR STOOL WHILE STANDING UNSUPPORTED:(3) Pt able to stand independently and complete 8 steps >20 seconds  13. STANDING UNSUPPORTED ONE FOOT IN FRONT: (3) Pt able to place foot ahead of other independently and hold 30 seconds  14. STANDING ON ONE LEG:(1) Pt tries to lift leg unable to hold 3 seconds but remains standing independently    Total Score 48/56 (41-56 = low fall risk)    Therapeutic Activities and Exercises:  PT arrived to pt's room to find pt resting quietly; agreeable to PT. Pt performed mobility and testing as above. Upon return to room, PT, pt and pt's spouse discussed recommendations and improved  functional status. Pt's spouse pleased with pt's progress. PT and pt discussed stroke s/s at length 2/2 poor recall from previous session. FAST acronym utilized for ease of understanding. PT educated pt and pt's spouse on expectations of right brain injury vs. Left brain injury. RN alerted that pt remains UIC. Discussed with OT and communicated pt's need with stroke team. Questions/concerns addressed within PT scope of practice.      AM-PAC 6 CLICK MOBILITY  How much help from another person does this patient currently need?   1 = Unable, Total/Dependent Assistance  2 = A lot, Maximum/Moderate Assistance  3 = A little, Minimum/Contact Guard/Supervision  4 = None, Modified [place]/Independent    Turning over in bed (including adjusting bedclothes, sheets and blankets)?: 4  Sitting down on and standing up from a chair with arms (e.g., wheelchair, bedside commode, etc.): 4  Moving from lying on back to sitting on the side of the bed?: 4  Moving to and from a bed to a chair (including a wheelchair)?: 4  Need to walk in hospital room?: 4  Climbing 3-5 steps with a railing?: 3  Total Score: 23    AM-PAC Raw Score CMS G-Code Modifier Level of Impairment Assistance   6 % Total / Unable   7 - 9 CM 80 - 100% Maximal Assist   10 - 14 CL 60 - 80% Moderate Assist   15 - 19 CK 40 - 60% Moderate Assist   20 - 22 CJ 20 - 40% Minimal Assist   23 CI 1-20% SBA / CGA   24 CH 0% Independent/ Mod I     Patient left up in chair with all lines intact, call button in reach, RN notified and spouse present.    Assessment:  Aziza Cope is a 42 y.o. female with a medical diagnosis of Embolic stroke involving right carotid artery and presents with improved functional mobility and activity tolerance this date. Pt demo improved safety awareness and insight to deficits this date. Pt req supervision/SBA for majority of mobility. RADER balance score of 48/56 indicating low fall risk. No further Acute PT needs. Appropriate for OP  therapy at this time. D/C Acute PT services 2017.    Rehab identified problem list/impairments: Rehab identified problem list/impairments: impaired cognition, impaired balance, pain     Rehab potential is Good.    Activity tolerance: Good    Discharge recommendations: Discharge Facility/Level Of Care Needs: outpatient PT     Barriers to discharge: Barriers to Discharge: None    Equipment recommendations: Equipment Needed After Discharge: shower chair     GOALS:    Physical Therapy Goals     Not on file          Multidisciplinary Problems (Resolved)        Problem: Physical Therapy Goal    Goal Priority Disciplines Outcome Goal Variances Interventions   Physical Therapy Goal   (Resolved)     PT/OT, PT Outcome(s) achieved     Description:  Goals to be met by: 2017     Patient will increase functional independence with mobility by performin. Supine to sit with [place]  2. Sit to supine with [place]  3. Sit to stand transfer with [place]  4. Bed to chair transfer with [place]  5. Gait x150 feet with Supervision while performing cognitively demanding tasks  6. Ascend/descend x1 flight of stairs with right Handrails Stand-by Assistance  7. Stand for x10 minutes with Stand-by Assistance while performing dynamic balance tasks  8. Patient and family will be (I) with stroke signs/symptoms (FAST acronym)                    PLAN:    D/C Acute PT services 2017. Home with support of family and OP PT.  Plan of Care reviewed with: patient, spouse     Dara Jerez, PT, DPT  [phone]  2017

## 2017-06-07 NOTE — PROGRESS NOTES
Pt transport here for pickup to CT. Pt in stable condition for transfer.    6/7/17 @ 0320- Patient back from CT in stable condition.

## 2017-06-07 NOTE — PROGRESS NOTES
Patient transport here for pickup to MRI by way of stretcher. Pt in stable condition for transport.

## 2017-06-07 NOTE — PT/OT/SLP EVAL
Speech Language Pathology Evaluation/Discharge    Aziza Cope   MRN: [id number]   Admitting Diagnosis: Embolic stroke involving right carotid artery    Diet recommendations: Solid Diet Level: Regular  Liquid Diet Level: Thin   Standard aspiration precautions    SLP Treatment Date: 17  Speech Start Time: 835   And 844  Speech Stop Time: 08   And 0855  Speech Total (min): 4 min   And 11 min    TREATMENT BILLABLE MINUTES:  Eval 8  and Eval Swallow and Oral Function 8    Diagnosis: Embolic stroke involving right carotid artery      Past Medical History:   Diagnosis Date    Hypertension     Hypothyroidism      Past Surgical History:   Procedure Laterality Date    THYROIDECTOMY         Has the patient been evaluated by SLP for swallowing? : Yes  Keep patient NPO?: No   General Precautions: Standard,            Social Hx: Patient lives with spouse and children.    Prior diet: no reported restrictions. 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    Subjective:  Pt awake, pleasant  Patient goals: to go back to the way she was before    Pain/Comfort  Pain Rating 1: 0/10  Pain Rating Post-Intervention 1: 0/10    Objective:        Oral Musculature Evaluation  Oral Musculature:  (very mild L asymmetry)  Dentition: present and adequate  Mucosal Quality: good  Mandibular Strength and Mobility: WFL  Oral Labial Strength and Mobility: WFL  Lingual Strength and Mobility: WNL  Buccal Strength and Mobility: WFL  Volitional Cough: strong  Voice Prior to PO Intake: clear     Cognitive Status:  Behavioral Observations: alert and appropriate-  Orientation, mental manipulation, sequencing, immediate recall, complex reasoning, convergent categorization: 100% acc indep   Attention: WNL to assessment stimuli  Delayed recall: 33% acc indep  Complex problem solvin% acc indep    Language:   Receptive Language:  LR  discrimination, complex YN questions, follow 1-3 step commands, paragraph comprehension, identified objects FC3: 100% acc indep     Expressive Language:  Basic conversation, rote greetings, sentence completion, naming objects: 100% acc indep     Motor Speech: No evidence of impairment    Voice: No evidence of impairment    Reading: Read aloud and identified 2 sentences: 100% acc indep     Writing: wrote name to dictation using dominant hand: 100% acc indep     Visual-Spatial: Copied 3D object: approx 50% acc indep    Bedside Swallow Eval:  Consistencies Assessed: Thin liquids 4 oz via straw, Puree 3 full spoonfuls and Solids 1/4 delisa cracker  Oral Phase: WFL  Pharyngeal Phase: no overt clinical  signs/symptoms of aspiration and no overt clinical signs/symptoms of pharyngeal dysphagia      Additional Treatment:    Pt denies cognitive-linguistic and swallow changes sp admit. Pt admits to baseline. Skilled education provided on aspiration precautions and diet recommendations. Whiteboard updated with diet recommendations/aspiration precautions.  No further questions.      FIM:  Social Interaction: 7 Complete [place]--The patient interacts appropriately with staff, other patients, and family members (e.g., controls temper, accepts criticism, is aware that words and actions have an impact on others), and does not require medication for   Problem Solvin Modified [place]--In most situations, the patient recognizes a present problem, and with only mild difficulty makes appropriate decisions, initiates and carries out a sequence of steps to solve complex problems, or requires more than a   Comprehension: 7 Complete [place]--The patient understand complex or abstract directions and conversation, and understand either spoken or written language (not necessarily English).   Expression: 7 Complete [place]--The patient expresses complex or abstract ideas clearly and fluently (not necessarily in English).    Memory: 6 Modified [place]--The patient appears to have only mild difficulty recognizing people frequently encountered, remembering daily routines, and responding to requests of others.  The patient may use  self-initiated or environmental cues, prompts,     Assessment:  Aziza Cope is a 42 y.o. female with baseline swallow function and baseline cognitive-linguistic skills vs. High level impairment. No SLP fu rec'd in this setting.     Do you have any cultural, spiritual, Jainism conflicts, given your current situation?: no    Discharge recommendations: Discharge Facility/Level Of Care Needs: OP or HH SLP      Goals:   NA    Plan:   Plan of Care reviewed with: patient  SLP Follow-up?: No              Rosemarie Rendon M.A. CCC-SLP  Speech Language Pathologist  [phone]  6/7/2017

## 2017-06-07 NOTE — PLAN OF CARE
Problem: Patient Care Overview  Goal: Individualization & Mutuality  Admit: 6/5/17 CVA s/p TPA and thrombectomy     Past Medical History:  No date: Hypertension  No date: Hypothyroidism    Past Surgical History:  No date: THYROIDECTOMY       Outcome: Ongoing (interventions implemented as appropriate)  POC reviewed with patient and family. Pt has remained free from falls/injury falls tonight. Consistent with calling for assist with her transfers. Ambulatory with supervision-gait steady. Continent-uses bathroom commode. Speech is clear and easy to understand. No new skin impairments noted. Afebrile. Continuous telemetry monitoring in progress. Independent with bed mobility. Complaints of a headache that resolved with Tylenol earlier tonight.  MRI on hold for now-secondary to s/p stent placement. Safety precautions maintained.

## 2017-06-08 ENCOUNTER — TELEPHONE (OUTPATIENT)
Dept: NEUROSURGERY | Facility: HOSPITAL | Age: 42
End: 2017-06-08

## 2017-06-08 ENCOUNTER — NURSE TRIAGE (OUTPATIENT)
Dept: ADMINISTRATIVE | Facility: CLINIC | Age: 42
End: 2017-06-08

## 2017-06-08 ENCOUNTER — TELEPHONE (OUTPATIENT)
Dept: ADMINISTRATIVE | Facility: OTHER | Age: 42
End: 2017-06-08

## 2017-06-08 ENCOUNTER — TELEPHONE (OUTPATIENT)
Dept: NEUROLOGY | Facility: CLINIC | Age: 42
End: 2017-06-08

## 2017-06-08 NOTE — TELEPHONE ENCOUNTER
Patient advised per OOC protocol verbalized understanding, agreed with recommendations to be evaluated with a PCP appointment states she will self-arranged such. Patient ended call prior to care advisements.

## 2017-06-08 NOTE — TELEPHONE ENCOUNTER
Called number on file.  Spoke with patient's  Mr. Cope who is primary caregiver.  Risk factors specific to patient for stroke discussed with teach back implemented.  Patient's  verbalized understanding of discharge instructions and medications.  Patient's  was asked about discharge appointments and follow up care.  Follow appointment scheduled for 7/6/2017 at 0800. Warning signs discussed with teach back discussion method implemented.  Notified to seek immediate medical help via 911 if new or worsening stroke symptoms occur.  Patient and  relayed no new questions or comments at this time.  Instructed to call [facility] with any further questions.

## 2017-06-08 NOTE — TELEPHONE ENCOUNTER
Call received from EC/Spouse states during patient's MD visit it was MD medical opinion the source of her discomfort was related to post angiogram procedure  Angiogram 6/5

## 2017-06-08 NOTE — TELEPHONE ENCOUNTER
Call placed to Dr. Vega office informed per Emili, staff member provider is out-of-office until Tuesday staff advised patient to rake OTC Tylenol as directed to alievate discomfort and if symptoms persist or worsen patient should seek medical care in ED. EC/Sppuse informed/undertood/agreed to such.

## 2017-06-08 NOTE — TELEPHONE ENCOUNTER
----- Message from ANAHI Valdez sent at 6/6/2017  2:22 PM CDT -----  Regarding: clinic in june Hi, Dr Vega asked if we could please set ms chin up with him for the 2nd date he has open in June.   Thanks!

## 2017-06-08 NOTE — TELEPHONE ENCOUNTER
Patient's  called seeking medical advice about his wife. He had already spoken to [facility], and PCP. The patient has not been referred to [facility], and her insurance is not a plan that we follow in [facility]. This SSC advised him to speak with his wife's inpatient , Delia Green for further assistance.    Thank you,  Tasneem Farmer, SSC

## 2017-06-08 NOTE — TELEPHONE ENCOUNTER
Reason for Disposition   Earache also present     Patient calling states she is experiencing mild sore throat swelling on right side of throat/neck and under ear is mildly painful as well.    Protocols used: ST SORE THROAT-A-OH

## 2017-06-08 NOTE — PLAN OF CARE
06/08/2017      Aziza Cope  [address] [place] [id number]          Hospital Medicine Dept.  [facility] [address] [place] [address] [phone] after hours  [phone] fax Diagnosis:  Embolic stroke involving right carotid artery                         Debility    Physical therapy and Occupational Therapy evaluate and treat.    ICD-10-CM: I63.131        __________________________  Dr Raz Vega  06/08/2017

## 2017-06-14 PROBLEM — M62.81 MUSCLE WEAKNESS: Status: ACTIVE | Noted: 2017-06-14

## 2017-06-14 PROBLEM — Z74.09 IMPAIRED FUNCTIONAL MOBILITY AND ACTIVITY TOLERANCE: Status: ACTIVE | Noted: 2017-06-14

## 2017-06-22 ENCOUNTER — TELEPHONE (OUTPATIENT)
Dept: NEUROLOGY | Facility: CLINIC | Age: 42
End: 2017-06-22

## 2017-06-22 NOTE — TELEPHONE ENCOUNTER
----- Message from Ciera Moore sent at 6/22/2017  9:11 AM CDT -----  Contact: Pt  Would like to reschedule appt that was missed, I was unable to reschedule appt.       [phone]

## 2017-06-22 NOTE — TELEPHONE ENCOUNTER
----- Message from Tommie Lynn sent at 6/21/2017  6:45 PM CDT -----  Contact:   Requesting to be seen by Dr Vega for hospital f/u that was missed on 06/15... Contact Mr. Collins at  [phone]

## 2017-06-23 PROBLEM — M62.81 MUSCLE WEAKNESS OF LEFT UPPER EXTREMITY: Status: ACTIVE | Noted: 2017-06-23

## 2017-06-23 PROBLEM — R29.898 DECREASED GRIP STRENGTH OF LEFT HAND: Status: ACTIVE | Noted: 2017-06-23

## 2017-06-26 ENCOUNTER — OFFICE VISIT (OUTPATIENT)
Dept: NEUROLOGY | Facility: CLINIC | Age: 42
End: 2017-06-26
Payer: COMMERCIAL

## 2017-06-26 VITALS
WEIGHT: 205 LBS | HEIGHT: 63 IN | BODY MASS INDEX: 36.32 KG/M2 | SYSTOLIC BLOOD PRESSURE: 119 MMHG | HEART RATE: 58 BPM | DIASTOLIC BLOOD PRESSURE: 77 MMHG

## 2017-06-26 DIAGNOSIS — I77.3 FIBROMUSCULAR DYSPLASIA OF CERVICOCRANIAL ARTERY: ICD-10-CM

## 2017-06-26 DIAGNOSIS — I63.131 EMBOLIC STROKE INVOLVING RIGHT CAROTID ARTERY: Primary | ICD-10-CM

## 2017-06-26 PROCEDURE — 99215 OFFICE O/P EST HI 40 MIN: CPT | Mod: S$GLB,,, | Performed by: PSYCHIATRY & NEUROLOGY

## 2017-06-26 PROCEDURE — 99999 PR PBB SHADOW E&M-EST. PATIENT-LVL II: CPT | Mod: PBBFAC,,, | Performed by: PSYCHIATRY & NEUROLOGY

## 2017-06-26 NOTE — PROGRESS NOTES
Vascular Neurology  Clinic Note      Reason for Consult (Chief Complaint): Hospital follow up    SUBJECTIVE:     History of Present Illness:  Patient is a 42 y.o. 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  She was seen in ED and treated with thrombectomy and web stenting. She had a remarkable recovery with her initial presentation being left plegia and neglect.  Since she has continued to improve and has not had recurrent symptoms.  Reports being compliant with her medications.    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  Social History   Substance Use Topics    Smoking status: Current Every Day Smoker     Types: Vaping with nicotine    Smokeless tobacco: Not on file    Alcohol use No     Review of patient's allergies indicates:  No Known Allergies    Medications:   I have reviewed the current medication administration record.  Antiplatelet therapy:  Plavix 75mg, Aspirin 325mg  Statin therapy: Atorvastatin- 40 mg oral daily  For a complete list of medications please see the medication list.    Review of Systems:  Constitutional: no fever, no chills, no fatigue  Eyes: no eyesight worsening, no double vision, no eye pain  ENT/Mouth: no nasal congestion or nose bleeds, no sore throat or hoarseness.  Cardiovascular: no chest pain w/exertion, no palpitations, no leg pain while walking, no irregular heartbeat  Respiratory: no cough or shortness of breath, no coughing up blood  Gastrointestinal: no nausea or vomiting, no abdominal pain or change in bowel habits, no black/bloody stools  Genitourinary: no frequent voiding or blood in urine  Musculoskeletal: no joint pain, no muscle pain  "  Skin/Breast: negative for rash or skin lesions  Hematologic: no easy bruising  Neurological: no headaches, dizziness, or confusion  Sleep: negative for snoring or breath holding  Psychiatric: no auditory or visual hallucinations, no anxiety, no depression/worrying alot  Endocrine: no heat or cold intolerance, no excessive thirst    OBJECTIVE:     Vital Signs (Most Recent):  /77 (BP Location: Left arm, Patient Position: Sitting, BP Method: Automatic)   Pulse (!) 58   Ht 5' 3" (1.6 m)   Wt 93 kg (205 lb 0.4 oz)   LMP 06/05/2017 (Exact Date)   BMI 36.32 kg/m²     Physical Exam:  General: well developed, well nourished  Head/Neck: atraumatic, thyroid normal, no palpable mass  Eyes: fundus normal, no disc edema, no vessel changes  Respiratory: clear to auscultation  Vascular: no carotid bruits  Cardiac: regular rate and rhythm  Abdomen: soft, non-tender  Skin: negative for rash or hives    Scores:  NIHSS: 0 (06/26/17 1256)   ABCD2:     Modified [place]: No Significant Disability (06/26/17 1256)   CHADS2:       Neurological Exam:   LOC: alert and follows requests  Language: No aphasia  Speech: No dysarthria  Orientation: Person, Place, Time  Memory: Recent memory intact, Remote memory intact, Age correct, Month correct  Visual Fields (CN II): Full  EOM (CN III, IV, VI): Full/intact  Oculocephalics: normal  Pupils (CN III, IV, VI): PERRL  Facial Sensation (CN V): Symmetric, Corneal reflex intact  Facial Movement (CN VII): symmetric facial expression and very mild left decrease in nasolabial fold  Hearing (CN VIII): intact bilaterally  Gag Reflex (CN IX, X): normal/symmetric  Shoulder/Neck (CN XI): SCM-Left: Normal ; SCM-Right: Normal ; Shoulder Shrug: Normal/Symetric  Tongue (CN XII): to midline  Reflexes: flexor plantar responses bilaterally and 2+ throughout  Motor*: Arm Left:  Normal (5/5), Leg Left:   Normal (5/5), Arm Right:   Normal (5/5), Leg Right:   Normal (5/5)  Cerebellar*: Normal limb, Normal gait  " , Normal stance  Sensation: intact to light touch, temperature and vibration  Tone: Arm-Left: normal; Leg-Left: normal; Arm-Right: normal; Leg-Right: normal    Laboratory:  BMP:   Lab Results   Component Value Date     06/07/2017    K 4.1 06/07/2017     06/07/2017    CO2 21 (L) 06/07/2017    BUN 15 06/07/2017    CREATININE 0.8 06/07/2017    CALCIUM 8.3 (L) 06/07/2017     CBC:   Lab Results   Component Value Date    WBC 8.63 06/07/2017    RBC 4.22 06/07/2017    HGB 12.8 06/07/2017    HCT 38.5 06/07/2017     06/07/2017    MCV 91 06/07/2017    MCH 30.3 06/07/2017    MCHC 33.2 06/07/2017     Lipid Panel:   Lab Results   Component Value Date    CHOL 184 06/05/2017    LDLCALC 128.4 06/05/2017    HDL 42 06/05/2017    TRIG 68 06/05/2017     Coagulation:   Lab Results   Component Value Date    INR 1.1 06/05/2017    APTT 33.8 06/05/2017     Hgb A1C:   Lab Results   Component Value Date    HGBA1C 6.1 06/05/2017     TSH:   Lab Results   Component Value Date    TSH <0.02 (L) 06/05/2017     Excellent response to Plavix and Aspirin    Diagnostic Results:  Angio: 6.5.17                      ASSESSMENT/PLAN:     Diagnosis:  Intracranial embolism with stroke (I63.40)  Carotid web  Stroke Risk Factors:  I10 Hypertension, 305.11 Smoker  Effects of Stroke:  Mild left NL fold decrease    Recommendations:  Follow up angiogram in 3 months  Dual antiplatelets for 3 months, then asa only  Follow up in clinic in 3 months -- ALEX ok.    Family reports mild cognitive decline, if this persists in 3 months, then consider neuropsych testing for cognitive eval and therapy.    Raz Vega MD  Vascular and Interventional Neurology Staff  Director of [facility]  [facility]  [phone]

## 2017-08-28 RX ORDER — IBUPROFEN 200 MG
TABLET ORAL
Qty: 14 PATCH | Refills: 0 | OUTPATIENT
Start: 2017-08-28

## 2017-09-06 RX ORDER — CLOPIDOGREL BISULFATE 75 MG/1
TABLET ORAL
Qty: 90 TABLET | Refills: 0 | OUTPATIENT
Start: 2017-09-06

## 2017-09-06 RX ORDER — ATORVASTATIN CALCIUM 40 MG/1
TABLET, FILM COATED ORAL
Qty: 90 TABLET | Refills: 0 | OUTPATIENT
Start: 2017-09-06

## 2017-09-25 ENCOUNTER — TELEPHONE (OUTPATIENT)
Dept: ADMINISTRATIVE | Facility: OTHER | Age: 42
End: 2017-09-25

## 2017-10-04 ENCOUNTER — OFFICE VISIT (OUTPATIENT)
Dept: NEUROLOGY | Facility: CLINIC | Age: 42
End: 2017-10-04
Payer: COMMERCIAL

## 2017-10-04 VITALS
WEIGHT: 212.31 LBS | HEART RATE: 70 BPM | SYSTOLIC BLOOD PRESSURE: 128 MMHG | DIASTOLIC BLOOD PRESSURE: 84 MMHG | HEIGHT: 63 IN | BODY MASS INDEX: 37.62 KG/M2

## 2017-10-04 DIAGNOSIS — I77.3 FIBROMUSCULAR DYSPLASIA OF CERVICOCRANIAL ARTERY: Primary | ICD-10-CM

## 2017-10-04 DIAGNOSIS — I63.131 EMBOLIC STROKE INVOLVING RIGHT CAROTID ARTERY: ICD-10-CM

## 2017-10-04 PROCEDURE — 99999 PR PBB SHADOW E&M-EST. PATIENT-LVL III: CPT | Mod: PBBFAC,,, | Performed by: PHYSICIAN ASSISTANT

## 2017-10-04 PROCEDURE — 99215 OFFICE O/P EST HI 40 MIN: CPT | Mod: S$GLB,,, | Performed by: PHYSICIAN ASSISTANT

## 2017-10-04 NOTE — PROGRESS NOTES
[facility], Department of Neurology   [facility] [place] [address]  Phone:[phone]  Fax: [phone]    Patient name: Aziza Cope  : 1975  MRN: [id number]    10/04/2017      Chief complaint:   Chief Complaint   Patient presents with    Consult       PCP: Vinny Coronado MD    Assessment:     ICD-10-CM ICD-9-CM    1. 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 Embolic stroke involving right carotid artery I63.131 434.11 IR Angiogram Carotid Cerebral Bilateral inc Arch       Dx: R MCA stroke due to a R carotid web s/p tPA and stent    Plan:  -continue asa and lipitor  -repeat angiogram  -Continue f/u with PCP regarding stroke risk factor modification as outlined below and discussed with patient.   -RTC after above     Stroke education: Continue to address with PCP these risk factor guidelines: SBP<140, FBS<100, LDL<70 mg/dL, antiplatelet. Continue exercise as tolerated, avoid tobacco, abstain from ETOH (<3 bevs optimal a week), stay well hydrated, avoid PO intake of NaCl, regular sleep. Will have patient continue to address this with PCP. Discussed CVA symptoms with patient at length, etiology of CVA and need to remain compliant on all medications. Mentioned that medication is preventative however nothing is absolute. Robust recovery first 4-6 months up to a year for noticeable improvement. If symptomatic, will need to report to ED in <2h for prompt eval. Patient voiced understanding.  All questions answered. The patient indicates understanding of these issues and agrees to the plan.    HPI:  Aziza Cope is a 41 yo female with hx of R MCA stroke due to a R carotid web s/p tPA and stent presents for f/u.     She d/c plavix when her script ran out, took x 90 days. She had also stopped Lipitor for a couple days, then cardiologist placed her back on it.     Denies new stroke signs/symptoms. Occasional  HA, takes Advil with relief. States she some difficulty sleeping since stroke. Denies depression.     Cerebrovascular history:   6/5-6/7/17  History of Present Illness: 42 y.o. female  with PMHx of HTN, hypothyroidism who presented to [facility] with mild headache and nausea starting yesterday with today having acute onset of left hemiplegia. Last seen normal at 1045 and last heard on phone normal at 1055, family member came back into the house at 1100 to see she had fallen on the couch, noted to have facial droop and left sided weakness. These symptoms have never happened before. There are no identified triggers or modifying factors. There have been no recurrent events. There are no other associated symptoms.  Patient seen via telemedicine - was administered tpa for R MCA syndrome. Tpa infusion was completed at 1:20pm.      Hospital Course (synopsis of major diagnoses, care, treatment, and services provided during the course of the hospital stay): Ms. Cope is a 41yo F with a R MCA stroke due to a R carotid web s/p tPA and stent.   Repeat CTH 6/6/17 with evolving right posterior internal capsule infarct and right medial temporal lobe infarct. She is on aspirin and plavix due to stent placement and atorvastatin 40mg (). Ordered response to aspirin and plavix, but may not result before patient discharge so will follow up with as OP. She has recovered well and has mild facial droop. Therapy has evaluated her and found that she has no major deficits physically but she is very impulsive and has poor insight into her subtle deficits so is recommended for OP PT/OT. It is recommended that she not drive until discharged by PT/OT.   She is pending ECHO for discharge.     Dr. Vega clinic note 6/26/17  History of Present Illness:  Patient is a 42 y.o. 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  She was seen in ED and treated with  thrombectomy and web stenting. She had a remarkable recovery with her initial presentation being left plegia and neglect.  Since she has continued to improve and has not had recurrent symptoms.  Reports being compliant with her medications.    Recommendations:  Follow up angiogram in 3 months  Dual antiplatelets for 3 months, then asa only  Follow up in clinic in 3 months -- ALEX ok.     Family reports mild cognitive decline, if this persists in 3 months, then consider neuropsych testing for cognitive eval and therapy.          Medications:   Current Outpatient Prescriptions   Medication Sig Dispense Refill    aspirin 325 MG tablet Take 1 tablet (325 mg total) by mouth once daily.  0    levothyroxine (SYNTHROID) 125 MCG tablet Take 125 mcg by mouth once daily.      nicotine (NICODERM CQ) 14 mg/24 hr Place 1 patch onto the skin once daily. Use daily for 2 weeks then reduce dose to 7mg patch. 14 patch 0    nicotine (NICODERM CQ) 7 mg/24 hr Place 1 patch onto the skin once daily. Use 1 patch daily for 2 weeks after finishing 14mg patches. 14 patch 0     No current facility-administered medications for this visit.        Allergies:  Review of patient's allergies indicates:  No Known Allergies    PMHx:  Past Medical History:   Diagnosis Date    Embolic stroke involving right carotid artery     Hypertension     Hypothyroidism     Right Carotid Web s/p Stenting 6/5/17 6/6/2017     Past Surgical History:   Procedure Laterality Date    THYROIDECTOMY         Fhx:  History reviewed. No pertinent family history.    Shx:   Social History     Social History    Marital status:      Spouse name: N/A    Number of children: N/A    Years of education: N/A     Occupational History    Not on file.     Social History Main Topics    Smoking status: Current Every Day Smoker     Types: Vaping with nicotine    Smokeless tobacco: Current User    Alcohol use No    Drug use: No    Sexual activity: Not on file     Other  "Topics Concern    Not on file     Social History Narrative    No narrative on file       Labs:  Results for LUZ LOCKWOOD (MRN [id number]) as of 10/4/2017 16:26   Ref. Range 2017 16:55   Cholesterol Latest Ref Range: 120 - 199 mg/dL 184   HDL Latest Ref Range: 40 - 75 mg/dL 42   LDL Cholesterol Latest Ref Range: 63.0 - 159.0 mg/dL 128.4   Total Cholesterol/HDL Ratio Latest Ref Range: 2.0 - 5.0  4.4   Triglycerides Latest Ref Range: 30 - 150 mg/dL 68   Results for LUZ LOCKWOOD (MRN [id number]) as of 10/4/2017 16:26   Ref. Range 2017 16:55   Hemoglobin A1C Latest Ref Range: 4.5 - 6.2 % 6.1   Estimated Avg Glucose Latest Ref Range: 68 - 131 mg/dL 128   Free T4 Latest Ref Range: 0.71 - 1.51 ng/dL 1.17       Imaging:  Angio: 6.5.17                        ROS:   Review Of Systems (questions asked, positive or additions in BOLD)  Gen: Weight change, fatigue/malaise, pyrexia   HEENT: Tinnitus, headache,  blurred vision, eye pain, diplopia, photophobia   Card: Palpitations, CP   Pulm: SOB   Vas: Easy bruising, easy bleeding   GI: N/V/D/C, incontinence, hematemesis, hematochezia    : incontinence, hematuria   Endocrine: Temp intolerance, polyuria, polydipsia   M/S: Neck pain, myalgia, back pain, joint pain, falls    Neuro: PER HPI   PSY: Memory loss, confusion, depression, anxiety, trouble in sleep      Physical Exam:  /84   Pulse 70   Ht 5' 3" (1.6 m)   Wt 96.3 kg (212 lb 4.9 oz)   BMI 37.61 kg/m²     Well developed, well nourished male  Extremities: no edema    NIH Stroke Scale:  Level of Consciousness: 0 - alert  LOC Questions: 0 - answers both correctly  LOC Commands: 0 - performs both correctly  Best Gaze: 0 - normal  Visual: 0 - no visual loss  Facial Palsy: 0 - normal  Motor Left Arm: 0 - no drift  Motor Right Arm: 0 - no drift  Motor Left Le - no drift  Motor Right Le - no drift  Limb Ataxia: 0 - absent  Sensory: 0 - normal  Best Language: 0 - no aphasia  Dysarthria: 0 - " normal articulation  Extinction and Inattention: 0 - no neglect    NIH: 0  Mental status:                        Awake, alert and appropriately oriented                        Normal recent and remote memory                        Normal attention and concentration                        Normal speech and language                        Normal fund of knowledge                        No extinction  Cranial nerves:                        PERRLA                        EOMF without nystagmus                        VFF                        Normal facial sensation                        Normal facial movements                        Intact hearing bilaterally                        Palate elevates symmetrically                        Normal SCM and trapezius strength                        Tongue midline  Motor:                        No pronator drift                        Normal FF movements bilaterally                        Normal muscle tone, bulk and power                        No abnormal movements  Sensory                        Intact to LT  DTRs                        2+ and symmetric  Coordination                        Intact to FNF and HTS  Gait                        Normal base and gait        Attending, Dr. Latif, was available during today's encounter. Any change to plan along with cosign to appear in the EMR.       This document has been electronically signed by July Boles PA-C on 10/4/2017, 3:30 PM. I have personally typed this message using the EMR.       July Boles PA-C  Department of Neurology   [facility] [place], [place]

## 2017-10-04 NOTE — LETTER
October 4, 2017      Raz Vega MD  [address] [address]           [facility]  [address]  [facility] [address]  Phone: [phone]  Fax: [phone]          Patient: Aziza Cope   MR Number: [id number]   YOB: 1975   Date of Visit: 10/4/2017       Dear Dr. Raz Vega:    Thank you for referring Aziza Cope to me for evaluation. Attached you will find relevant portions of my assessment and plan of care.    If you have questions, please do not hesitate to call me. I look forward to following Aziza Cope along with you.    Sincerely,    July Boles PA-C    Enclosure  CC:  No Recipients    If you would like to receive this communication electronically, please contact [email or web address] or [phone] to request more information on [employer] access.    For providers and/or their staff who would like to refer a patient to [facility], please contact us through our one-stop-shop provider referral line, [facility], at [phone].    If you feel you have received this communication in error or would no longer like to receive these types of communications, please e-mail [email or web address]

## 2017-10-23 ENCOUNTER — TELEPHONE (OUTPATIENT)
Dept: NEUROLOGY | Facility: CLINIC | Age: 42
End: 2017-10-23

## 2017-10-23 NOTE — TELEPHONE ENCOUNTER
----- Message from Arvind Sawant sent at 10/18/2017  2:05 PM CDT -----  Contact: Self @ [phone]  Pt states the doctor asked her to call to setup an appt to check her shunt. Pls call.

## 2017-10-25 ENCOUNTER — TELEPHONE (OUTPATIENT)
Dept: NEUROLOGY | Facility: CLINIC | Age: 42
End: 2017-10-25

## 2017-10-25 NOTE — TELEPHONE ENCOUNTER
----- Message from Sara Sullivan sent at 10/25/2017 12:25 PM CDT -----  Contact: Self  Pt is calling to be scheduled for an appt to have her stint checked.   unable to make appt due to an exhausted template.    She can be reached at [phone].    Thank you.

## 2017-11-07 DIAGNOSIS — I63.131 EMBOLIC STROKE INVOLVING RIGHT CAROTID ARTERY: Primary | ICD-10-CM

## 2017-11-14 DIAGNOSIS — I63.131 EMBOLIC STROKE INVOLVING RIGHT CAROTID ARTERY: Primary | ICD-10-CM

## 2017-11-15 ENCOUNTER — HOSPITAL ENCOUNTER (OUTPATIENT)
Facility: HOSPITAL | Age: 42
Discharge: HOME | End: 2017-11-15
Attending: PSYCHIATRY & NEUROLOGY | Admitting: PSYCHIATRY & NEUROLOGY
Payer: COMMERCIAL

## 2017-11-15 VITALS
HEIGHT: 63 IN | TEMPERATURE: 98 F | DIASTOLIC BLOOD PRESSURE: 65 MMHG | WEIGHT: 210 LBS | OXYGEN SATURATION: 99 % | SYSTOLIC BLOOD PRESSURE: 111 MMHG | RESPIRATION RATE: 16 BRPM | BODY MASS INDEX: 37.21 KG/M2 | HEART RATE: 48 BPM

## 2017-11-15 DIAGNOSIS — I77.3 FIBROMUSCULAR DYSPLASIA OF CERVICOCRANIAL ARTERY: ICD-10-CM

## 2017-11-15 DIAGNOSIS — I63.131 EMBOLIC STROKE INVOLVING RIGHT CAROTID ARTERY: ICD-10-CM

## 2017-11-15 LAB
B-HCG UR QL: NEGATIVE
CTP QC/QA: YES

## 2017-11-15 PROCEDURE — 63600175 PHARM REV CODE 636 W HCPCS: Performed by: RADIOLOGY

## 2017-11-15 PROCEDURE — 25500020 PHARM REV CODE 255: Performed by: PSYCHIATRY & NEUROLOGY

## 2017-11-15 PROCEDURE — 25000003 PHARM REV CODE 250: Performed by: FAMILY MEDICINE

## 2017-11-15 PROCEDURE — 81025 URINE PREGNANCY TEST: CPT | Performed by: FAMILY MEDICINE

## 2017-11-15 RX ORDER — ASPIRIN 325 MG
325 TABLET, DELAYED RELEASE (ENTERIC COATED) ORAL DAILY
Refills: 0 | COMMUNITY
Start: 2017-11-15 | End: 2022-09-04

## 2017-11-15 RX ORDER — MIDAZOLAM HYDROCHLORIDE 1 MG/ML
1 INJECTION INTRAMUSCULAR; INTRAVENOUS
Status: DISCONTINUED | OUTPATIENT
Start: 2017-11-15 | End: 2017-11-15 | Stop reason: HOSPADM

## 2017-11-15 RX ORDER — IODIXANOL 320 MG/ML
200 INJECTION, SOLUTION INTRAVASCULAR
Status: COMPLETED | OUTPATIENT
Start: 2017-11-15 | End: 2017-11-15

## 2017-11-15 RX ORDER — FENTANYL CITRATE 50 UG/ML
INJECTION, SOLUTION INTRAMUSCULAR; INTRAVENOUS CODE/TRAUMA/SEDATION MEDICATION
Status: COMPLETED | OUTPATIENT
Start: 2017-11-15 | End: 2017-11-15

## 2017-11-15 RX ORDER — SODIUM CHLORIDE 0.9 % (FLUSH) 0.9 %
5 SYRINGE (ML) INJECTION
Status: DISCONTINUED | OUTPATIENT
Start: 2017-11-15 | End: 2017-11-15 | Stop reason: HOSPADM

## 2017-11-15 RX ORDER — HEPARIN SODIUM 1000 [USP'U]/ML
3000 INJECTION, SOLUTION INTRAVENOUS; SUBCUTANEOUS ONCE
Status: DISCONTINUED | OUTPATIENT
Start: 2017-11-15 | End: 2017-11-15 | Stop reason: HOSPADM

## 2017-11-15 RX ORDER — MIDAZOLAM HYDROCHLORIDE 1 MG/ML
INJECTION INTRAMUSCULAR; INTRAVENOUS CODE/TRAUMA/SEDATION MEDICATION
Status: COMPLETED | OUTPATIENT
Start: 2017-11-15 | End: 2017-11-15

## 2017-11-15 RX ORDER — LEVOTHYROXINE SODIUM 125 UG/1
125 TABLET ORAL DAILY
Qty: 90 TABLET | Refills: 0 | Status: SHIPPED | OUTPATIENT
Start: 2017-11-15 | End: 2023-06-18

## 2017-11-15 RX ORDER — SODIUM CHLORIDE 9 MG/ML
500 INJECTION, SOLUTION INTRAVENOUS ONCE
Status: COMPLETED | OUTPATIENT
Start: 2017-11-15 | End: 2017-11-15

## 2017-11-15 RX ORDER — FENTANYL CITRATE 50 UG/ML
50 INJECTION, SOLUTION INTRAMUSCULAR; INTRAVENOUS
Status: DISCONTINUED | OUTPATIENT
Start: 2017-11-15 | End: 2017-11-15 | Stop reason: HOSPADM

## 2017-11-15 RX ADMIN — FENTANYL CITRATE 25 MCG: 50 INJECTION, SOLUTION INTRAMUSCULAR; INTRAVENOUS at 11:11

## 2017-11-15 RX ADMIN — MIDAZOLAM HYDROCHLORIDE 0.5 MG: 1 INJECTION, SOLUTION INTRAMUSCULAR; INTRAVENOUS at 11:11

## 2017-11-15 RX ADMIN — FENTANYL CITRATE 50 MCG: 50 INJECTION, SOLUTION INTRAMUSCULAR; INTRAVENOUS at 10:11

## 2017-11-15 RX ADMIN — MIDAZOLAM HYDROCHLORIDE 1 MG: 1 INJECTION, SOLUTION INTRAMUSCULAR; INTRAVENOUS at 10:11

## 2017-11-15 RX ADMIN — IODIXANOL 45 ML: 320 INJECTION, SOLUTION INTRAVASCULAR at 11:11

## 2017-11-15 RX ADMIN — SODIUM CHLORIDE 500 ML: 0.9 INJECTION, SOLUTION INTRAVENOUS at 09:11

## 2017-11-15 NOTE — PROCEDURES
Radiology Post-Procedure Note    Pre Op Diagnosis: right ICA stroke status post thrombectomy and stenting    Post Op Diagnosis: same    Procedure: Cerebral angiogram    Procedure performed by: Dr. Vega, Dr. Brady, Dr. Murrieta    Written Informed Consent Obtained: Yes    Specimen Removed: NO    Estimated Blood Loss: Minimal    Procedure report:     A 5F sheath was placed into the right femoral artery and a 5F Rashad catheter was advanced into the aortic arch.  The bilateral common carotid and right vertebral arteries were subselected and angiography of the brain was performed after injection into each of these vessels.    Preliminary interpretation: Patent right ICA stent without significant stenosis.  No left carotid web.  Please see Imaging report for full details.    The sheath removed and hemostasis achieved using Angioseal.  No hematoma was present at the time of hemostasis.    The patient tolerated the procedure well.     Сергей Brady MD  Neurointerventional Fellow  Department of Radiology  [phone]

## 2017-11-15 NOTE — DISCHARGE INSTRUCTIONS
For scheduling: Call Meena at [phone]    For questions or concerns call: KATHI MON-FRI 8 AM- 5PM [phone]. Radiology resident on call [phone].    For immediate concerns that are not emergent, you may call our radiology clinic at: [phone]

## 2017-11-15 NOTE — PROGRESS NOTES
Pt discharged to home.  Discharge instructions given, pt and  stated understanding.  Dressing to groin dry and intact, IV removed.  Pt left via wheelchair with family to home.

## 2017-11-15 NOTE — PROGRESS NOTES
Cerebral complete. Patient tolerated without any acute distress noted. Angio Seal deployed at 1124. HOB to remain flat until 1325. Patient to [facility]U to recover. Report to be given at the bedside.

## 2017-11-15 NOTE — PROGRESS NOTES
Pt arrived to ROCU, bay 4. Report received from TENISHA Peña. Dressing to groin dry and intact. Family at bedside.

## 2017-11-15 NOTE — H&P
Radiology History & Physical      SUBJECTIVE:     Chief Complaint: right ICA stroke    History of Present Illness:  Aziza Cope is a 42 y.o. female who presents for diagnostic angiography to evaluate right carotid stent.  Patient had right ICA stroke due to tandem occlusions treated with thrombectomy and stenting on 6/5/17.  Patient currently doing very well without deficits.  She has stopped her plavix but continues aspirin.    Past Medical History:   Diagnosis Date    Embolic stroke involving right carotid artery     Hypertension     Hypothyroidism     Right Carotid Web s/p Stenting 6/5/17 6/6/2017     Past Surgical History:   Procedure Laterality Date    CAROTID STENT Right     THYROIDECTOMY         Home Meds:   Prior to Admission medications    Medication Sig Start Date End Date Taking? Authorizing Provider   aspirin 325 MG tablet Take 1 tablet (325 mg total) by mouth once daily. 6/7/17 6/7/18 Yes Albertina Chavez PA-C   levothyroxine (SYNTHROID) 125 MCG tablet Take 125 mcg by mouth once daily.   Yes Historical Provider, MD   nicotine (NICODERM CQ) 14 mg/24 hr Place 1 patch onto the skin once daily. Use daily for 2 weeks then reduce dose to 7mg patch. 6/7/17  Yes Albertina Chavez PA-C   nicotine (NICODERM CQ) 7 mg/24 hr Place 1 patch onto the skin once daily. Use 1 patch daily for 2 weeks after finishing 14mg patches. 6/7/17  Yes Albertina Chavez PA-C     Anticoagulants/Antiplatelets: aspirin    Allergies: Review of patient's allergies indicates:  No Known Allergies  Sedation History:  no adverse reactions    Review of Systems:   Hematological: no known coagulopathies  Respiratory: no shortness of breath  Cardiovascular: no chest pain  Gastrointestinal: no abdominal pain  Genito-Urinary: no dysuria  Musculoskeletal: negative  Neurological: no TIA or stroke symptoms         OBJECTIVE:     Vital Signs (Most Recent)  Temp: 98.6 °F (37 °C) (11/15/17 0926)  Pulse: 85 (11/15/17 0926)  Resp: 18  (11/15/17 0926)  BP: (!) 143/76 (11/15/17 0927)  SpO2: 100 % (11/15/17 0926)    Physical Exam:  ASA: 3  Mallampati: 2    General: no acute distress  Mental Status: alert and oriented to person, place and time  HEENT: normocephalic, atraumatic  Chest: unlabored breathing  Heart: regular heart rate  Abdomen: nondistended  Extremity: moves all extremities    Laboratory  Lab Results   Component Value Date    INR 1.0 11/15/2017       Lab Results   Component Value Date    WBC 6.56 11/15/2017    HGB 14.3 11/15/2017    HCT 43.0 11/15/2017    MCV 92 11/15/2017     11/15/2017      Lab Results   Component Value Date     (H) 11/15/2017     11/15/2017    K 4.5 11/15/2017     (H) 11/15/2017    CO2 26 11/15/2017    BUN 15 11/15/2017    CREATININE 1.0 11/15/2017    CALCIUM 9.0 11/15/2017    MG 1.9 06/07/2017    ALT 19 11/15/2017    AST 19 11/15/2017    ALBUMIN 3.7 11/15/2017    BILITOT 0.5 11/15/2017       ASSESSMENT/PLAN:     42 year old female status post right ica stroke treated with stent and thrombectomy on 6/5/17.    Sedation Plan: moderate  Patient will undergo diagnostic cerebral angiogram to evaluate stent, intracranial flow, and possibility of carotid web in left ICA.    Сергей Brady MD  Neurointerventional Fellow  Department of Radiology  [phone]

## 2017-11-26 RX ORDER — CLOPIDOGREL BISULFATE 75 MG/1
TABLET ORAL
Qty: 90 TABLET | Refills: 0 | OUTPATIENT
Start: 2017-11-26

## 2022-09-04 ENCOUNTER — OFFICE VISIT (OUTPATIENT)
Dept: URGENT CARE | Facility: CLINIC | Age: 47
End: 2022-09-04
Payer: COMMERCIAL

## 2022-09-04 VITALS
HEIGHT: 63 IN | WEIGHT: 145 LBS | HEART RATE: 83 BPM | DIASTOLIC BLOOD PRESSURE: 79 MMHG | RESPIRATION RATE: 16 BRPM | OXYGEN SATURATION: 98 % | TEMPERATURE: 98 F | SYSTOLIC BLOOD PRESSURE: 139 MMHG | BODY MASS INDEX: 25.69 KG/M2

## 2022-09-04 DIAGNOSIS — M79.661 PAIN IN RIGHT LOWER LEG: ICD-10-CM

## 2022-09-04 DIAGNOSIS — S89.91XA INJURY OF RIGHT LOWER EXTREMITY, INITIAL ENCOUNTER: Primary | ICD-10-CM

## 2022-09-04 PROCEDURE — 73590 XR TIBIA FIBULA 2 VIEW RIGHT: ICD-10-PCS | Mod: RT,S$GLB,, | Performed by: RADIOLOGY

## 2022-09-04 PROCEDURE — 99204 OFFICE O/P NEW MOD 45 MIN: CPT | Mod: S$GLB,,, | Performed by: PHYSICIAN ASSISTANT

## 2022-09-04 PROCEDURE — 1160F PR REVIEW ALL MEDS BY PRESCRIBER/CLIN PHARMACIST DOCUMENTED: ICD-10-PCS | Mod: CPTII,S$GLB,, | Performed by: PHYSICIAN ASSISTANT

## 2022-09-04 PROCEDURE — 3078F PR MOST RECENT DIASTOLIC BLOOD PRESSURE < 80 MM HG: ICD-10-PCS | Mod: CPTII,S$GLB,, | Performed by: PHYSICIAN ASSISTANT

## 2022-09-04 PROCEDURE — 99204 PR OFFICE/OUTPT VISIT, NEW, LEVL IV, 45-59 MIN: ICD-10-PCS | Mod: S$GLB,,, | Performed by: PHYSICIAN ASSISTANT

## 2022-09-04 PROCEDURE — 1160F RVW MEDS BY RX/DR IN RCRD: CPT | Mod: CPTII,S$GLB,, | Performed by: PHYSICIAN ASSISTANT

## 2022-09-04 PROCEDURE — 3075F SYST BP GE 130 - 139MM HG: CPT | Mod: CPTII,S$GLB,, | Performed by: PHYSICIAN ASSISTANT

## 2022-09-04 PROCEDURE — 3078F DIAST BP <80 MM HG: CPT | Mod: CPTII,S$GLB,, | Performed by: PHYSICIAN ASSISTANT

## 2022-09-04 PROCEDURE — 1159F PR MEDICATION LIST DOCUMENTED IN MEDICAL RECORD: ICD-10-PCS | Mod: CPTII,S$GLB,, | Performed by: PHYSICIAN ASSISTANT

## 2022-09-04 PROCEDURE — 3008F BODY MASS INDEX DOCD: CPT | Mod: CPTII,S$GLB,, | Performed by: PHYSICIAN ASSISTANT

## 2022-09-04 PROCEDURE — 3075F PR MOST RECENT SYSTOLIC BLOOD PRESS GE 130-139MM HG: ICD-10-PCS | Mod: CPTII,S$GLB,, | Performed by: PHYSICIAN ASSISTANT

## 2022-09-04 PROCEDURE — 1159F MED LIST DOCD IN RCRD: CPT | Mod: CPTII,S$GLB,, | Performed by: PHYSICIAN ASSISTANT

## 2022-09-04 PROCEDURE — 73590 X-RAY EXAM OF LOWER LEG: CPT | Mod: RT,S$GLB,, | Performed by: RADIOLOGY

## 2022-09-04 PROCEDURE — 3008F PR BODY MASS INDEX (BMI) DOCUMENTED: ICD-10-PCS | Mod: CPTII,S$GLB,, | Performed by: PHYSICIAN ASSISTANT

## 2022-09-04 RX ORDER — ROSUVASTATIN CALCIUM 10 MG/1
10 TABLET, COATED ORAL DAILY
COMMUNITY
Start: 2022-06-29 | End: 2023-06-18

## 2022-09-04 RX ORDER — FUROSEMIDE 20 MG/1
20 TABLET ORAL DAILY PRN
COMMUNITY
Start: 2022-07-05

## 2022-09-04 NOTE — PROGRESS NOTES
"Subjective:       Patient ID: Aziza Cope is a 47 y.o. female.    Vitals:  height is 5' 3" (1.6 m) and weight is 65.8 kg (145 lb). Her oral temperature is 97.7 °F (36.5 °C). Her blood pressure is 139/79 and her pulse is 83. Her respiration is 16 and oxygen saturation is 98%.     Chief Complaint: Leg Pain    Pt states she was cleaning and she was on a ladder and when she went to step down. Reports that she forgot she was on a ladder and not a chair and came down on her right LE harder than expected. Reports that the pain continued and is worse with walking and certain positions. Denies numbness or weakness. No current treatment.    Leg Pain   Incident onset: yesterday. The injury mechanism was a fall. The pain is present in the right leg. The quality of the pain is described as aching. The pain is at a severity of 10/10. The pain is severe. The pain has been Intermittent since onset. Pertinent negatives include no inability to bear weight, numbness or tingling. She reports no foreign bodies present. The symptoms are aggravated by weight bearing (twisting). She has tried nothing for the symptoms.     Musculoskeletal:  Positive for pain and trauma.        Right lower leg pain   Neurological:  Negative for numbness and tingling.     Objective:      Physical Exam   Constitutional: She is oriented to person, place, and time. She appears well-developed. She is cooperative.  Non-toxic appearance. She does not appear ill. No distress.   HENT:   Head: Normocephalic and atraumatic.   Ears:   Right Ear: Hearing normal.   Left Ear: Hearing normal.   Eyes: Conjunctivae and lids are normal. No scleral icterus.   Neck: Phonation normal. Neck supple.   Cardiovascular: Normal rate and normal pulses.   Pulmonary/Chest: Effort normal. No respiratory distress.   Abdominal: Normal appearance.   Musculoskeletal:        Legs:       Comments: Ambulatory in clinic. No bruising, swelling or deformity noted. Intact distal pulses with " no sensory deficits. Cap refill less than 3 secs. No pain with valgus or varus strain of knee. Strength 5/5   Neurological: She is alert and oriented to person, place, and time. Coordination normal.   Skin: Skin is intact, not diaphoretic and not pale.   Psychiatric: Her speech is normal and behavior is normal. Judgment and thought content normal.   Nursing note and vitals reviewed.      Assessment:       1. Injury of right lower extremity, initial encounter    2. Pain in right lower leg          Plan:         Injury of right lower extremity, initial encounter  -     XR TIBIA FIBULA 2 VIEW RIGHT; Future; Expected date: 09/04/2022    Pain in right lower leg       XR TIBIA FIBULA 2 VIEW RIGHT    Result Date: 9/4/2022  EXAMINATION: XR TIBIA FIBULA 2 VIEW RIGHT CLINICAL HISTORY: Unspecified injury of right lower leg, initial encounter TECHNIQUE: AP and lateral views of the right tibia and fibula were performed. COMPARISON: None. FINDINGS: No fracture.  No lytic or blastic lesion.  Note made of degenerative changes at the ankle, plantar calcaneal spur and Achilles enthesopathy.     As above. Electronically signed by: Seamus Maria MD Date:    09/04/2022 Time:    12:14     Rest, ice, elevate, NSAIDs. Return PRN. Discussed with patient the importance of f/u with their primary care provider. Urged to go to the ER for any worsening signs or symptoms.

## 2022-10-04 NOTE — ASSESSMENT & PLAN NOTE
cessation   
-s/p tPA  -s/p right ICA web stenting  -s/p left ICA terminus thrombectomy  -- daily  --Plavix 75 mg daily (6/5 loaded 300mg)  -atorvastatin 40mgdaily  -- CT head 6/5 at 1800  right MCA infarct, involving the inferior temporal lobe and inferior parietal lobe, no significant mass effect, hydrocephalus, or hemorrhage.    --PT OT SLP  --stroke work up (including hypercoag panel as outpatient)    
2/2 stroke  PT/OT  
Continue home dose synthroid  
Continue home synthroid  
Hx of vaping  Nicotine patch 14mg daily started  
Improved after thrombectomy  Still with facial droop  
Left arm predominant hemiparesis  2/2 stroke  - PT/OT  
Left arm predominant hemiparesis  2/2 stroke  - PT/OT  
Left arm predominant hemiparesis; significantly improved  2/2 stroke  - PT/OT  
Left arm predominant hemiparesis; significantly improved to baseline  2/2 stroke  - PT/OT  
PT/OT consulted  
Patient is a 42 yr old female with PMHx of hypothyroidism, HTN who presented with R MCA syndrome. S/p tpa. CT remarkable for hyperdense MCA sign, ASPECT score of 9. 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     Antithrombotics for secondary stroke prevention: aspirin 325mg now. Recommend repeat CT head at 6pm. If no hemorrhage on imaging, recommend Plavix load - 300mg followed by 75mg qd - dual antiplatelet therapy and early administration prior to 24 hour window post tpa is in the setting of new R ICA stent.   Statins for secondary stroke prevention and hyperlipidemia, if present: Atorvastatin- 40 mg oral daily  Aggressive risk factor modification: Hypertension, Rehab Efforts: Physical Therapy, Occupational Therapy and Speech and Language Pathology  Diagnostics: Ordered/Pending CT scan of head without contrast to asses brain parenchyma, MRI head without contrast to assess brain parenchyma, Trans-thoracic cardiac echo to assess cardiac function/status   VTE Prophylaxis: hold for 24 hours post tpa (infusion completed at 1:20pm on 6/5)   BP Parameters: SBP <140 post thrombectomy   Nursing Orders: Neuro checks- q1h, Complete FIOR unless evaluated by speech, Seizure precautions, Out of bed BID, Avoid Boswell catheter, Pneumatic compression device, Stroke Education, Blood glucose target 100-130, Up with assistance  
Patient is a 42 yr old female with PMHx of hypothyroidism, HTN who presented with R MCA syndrome. S/p tpa. CT remarkable for hyperdense MCA sign, ASPECT score of 9. IR angiogram remarkable for R carotid T occlusion. R carotid web also seen - thought to be a possible etiology for the stroke in the absence of other known stroke risk factors in this young patient. However, if tele this admission unrevealing for PAF, will need longer term cardiac monitoring to complete workup. 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     Antithrombotics for secondary stroke prevention: ASA and plavix   Statins for secondary stroke prevention and hyperlipidemia, if present: Atorvastatin- 40 mg oral daily  Aggressive risk factor modification: Hypertension, Rehab Efforts: Physical Therapy, Occupational Therapy and Speech and Language Pathology  Diagnostics: Ordered/Pending - eval by pt ot speech   VTE Prophylaxis: hep vte   BP Parameters: SBP <140 post thrombectomy   Nursing Orders: neuro checks q 1 hour- can step down at 24 hours post intervention   
Patient is a 42 yr old female with PMHx of hypothyroidism, HTN who presented with R MCA syndrome. S/p tpa. CT remarkable for hyperdense MCA sign, ASPECT score of 9. IR angiogram remarkable for R carotid T occlusion. R carotid web also seen - thought to be a possible etiology for the stroke in the absence of other known stroke risk factors in this young patient. However, if tele this admission unrevealing for PAF, will need longer term cardiac monitoring to complete workup. 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     Antithrombotics for secondary stroke prevention: ASA and plavix; ordered platelet response to ASA and Plavix - may need to f/u as OP  Statins for secondary stroke prevention and hyperlipidemia, if present: Atorvastatin- 40 mg oral daily  Aggressive risk factor modification: Hypertension  Rehab Efforts:  - eval by pt ot speech. Recommended for OP follow up PT/OT   Diagnostics: Ordered/Pending: ECHO   VTE Prophylaxis: hep vte   BP Parameters: SBP <140 post thrombectomy   Nursing Orders: neuro checks q4 hours, fall precautions     
Patient is a 42 yr old female with PMHx of hypothyroidism, HTN who presented with R MCA syndrome. S/p tpa. CT remarkable for hyperdense MCA sign, ASPECT score of 9. IR angiogram remarkable for R carotid T occlusion. R carotid web also seen - thought to be a possible etiology for the stroke in the absence of other known stroke risk factors in this young patient. However, if tele this admission unrevealing for PAF, will need longer term cardiac monitoring to complete workup. 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     Antithrombotics for secondary stroke prevention: ASA and plavix; ordered platelet response to ASA and Plavix - may need to f/u as OP  Statins for secondary stroke prevention and hyperlipidemia, if present: Atorvastatin- 40 mg oral daily  Aggressive risk factor modification: Hypertension  Rehab Efforts:  - eval by pt ot speech. Recommended for OP follow up PT/OT   Diagnostics: Ordered/Pending: ECHO. Discharge after complete.  VTE Prophylaxis: hep vte   BP Parameters: SBP <140 post thrombectomy   Nursing Orders: neuro checks q4 hours, fall precautions     
SBP goal < 140   cardene gtt  Prn labetalol, hydralazine  Echo pending  
Should  cessation   
Stroke risk factor  SBP <140 post thrombectomy  
Stroke risk factor  SBP <140 post thrombectomy  
Stroke risk factor  SBP <140 post thrombectomy; at goal  
Stroke risk factor  SBP <140 post thrombectomy; at goal  
Will follow up with Dr. Gary dill    
accuchecks Q6h with SSI   Hgb A1C 6.1  
No

## 2023-06-18 ENCOUNTER — OFFICE VISIT (OUTPATIENT)
Dept: URGENT CARE | Facility: CLINIC | Age: 48
End: 2023-06-18
Payer: COMMERCIAL

## 2023-06-18 VITALS
HEIGHT: 64 IN | SYSTOLIC BLOOD PRESSURE: 123 MMHG | TEMPERATURE: 99 F | OXYGEN SATURATION: 96 % | HEART RATE: 87 BPM | WEIGHT: 150 LBS | RESPIRATION RATE: 18 BRPM | BODY MASS INDEX: 25.61 KG/M2 | DIASTOLIC BLOOD PRESSURE: 84 MMHG

## 2023-06-18 DIAGNOSIS — R05.1 ACUTE COUGH: ICD-10-CM

## 2023-06-18 DIAGNOSIS — J01.40 ACUTE PANSINUSITIS, RECURRENCE NOT SPECIFIED: Primary | ICD-10-CM

## 2023-06-18 PROCEDURE — 99214 OFFICE O/P EST MOD 30 MIN: CPT | Mod: 25,S$GLB,, | Performed by: NURSE PRACTITIONER

## 2023-06-18 PROCEDURE — 96372 THER/PROPH/DIAG INJ SC/IM: CPT | Mod: S$GLB,,, | Performed by: NURSE PRACTITIONER

## 2023-06-18 PROCEDURE — 96372 PR INJECTION,THERAP/PROPH/DIAG2ST, IM OR SUBCUT: ICD-10-PCS | Mod: S$GLB,,, | Performed by: NURSE PRACTITIONER

## 2023-06-18 PROCEDURE — 99214 PR OFFICE/OUTPT VISIT, EST, LEVL IV, 30-39 MIN: ICD-10-PCS | Mod: 25,S$GLB,, | Performed by: NURSE PRACTITIONER

## 2023-06-18 RX ORDER — DEXAMETHASONE SODIUM PHOSPHATE 100 MG/10ML
6 INJECTION INTRAMUSCULAR; INTRAVENOUS
Status: COMPLETED | OUTPATIENT
Start: 2023-06-18 | End: 2023-06-18

## 2023-06-18 RX ORDER — LEVOTHYROXINE SODIUM 88 UG/1
TABLET ORAL
COMMUNITY
Start: 2022-10-17

## 2023-06-18 RX ORDER — AZITHROMYCIN 250 MG/1
TABLET, FILM COATED ORAL
Qty: 6 TABLET | Refills: 0 | Status: SHIPPED | OUTPATIENT
Start: 2023-06-18

## 2023-06-18 RX ORDER — ROSUVASTATIN CALCIUM 20 MG/1
1 TABLET, COATED ORAL DAILY
COMMUNITY
Start: 2022-10-17

## 2023-06-18 RX ORDER — PROMETHAZINE HYDROCHLORIDE AND DEXTROMETHORPHAN HYDROBROMIDE 6.25; 15 MG/5ML; MG/5ML
5 SYRUP ORAL EVERY 4 HOURS PRN
Qty: 118 ML | Refills: 0 | Status: SHIPPED | OUTPATIENT
Start: 2023-06-18 | End: 2023-06-21

## 2023-06-18 RX ADMIN — DEXAMETHASONE SODIUM PHOSPHATE 6 MG: 100 INJECTION INTRAMUSCULAR; INTRAVENOUS at 09:06

## 2023-06-18 NOTE — PROGRESS NOTES
"Subjective:      Patient ID: Aziza Cope is a 48 y.o. female.    Vitals:  height is 5' 4" (1.626 m) and weight is 68 kg (150 lb). Her tympanic temperature is 99.3 °F (37.4 °C). Her blood pressure is 123/84 and her pulse is 87. Her respiration is 18 and oxygen saturation is 96%.     Chief Complaint: Sinus Problem    Sinus Problem  This is a new problem. The current episode started in the past 7 days. The problem has been gradually worsening since onset. There has been no fever. She is experiencing no pain. Associated symptoms include congestion, coughing, headaches, a hoarse voice, sinus pressure and a sore throat. Pertinent negatives include no chills, ear pain, neck pain, shortness of breath, sneezing or swollen glands. Treatments tried: mucinex,cold and flu,advil. The treatment provided no relief.     Constitution: Negative. Negative for chills.   HENT:  Positive for congestion, sinus pressure and sore throat. Negative for ear pain.    Neck: neck negative. Negative for neck pain.   Cardiovascular: Negative.    Respiratory:  Positive for cough. Negative for sputum production and shortness of breath.    Allergic/Immunologic: Negative for sneezing.   Neurological:  Positive for headaches.    Objective:     Physical Exam   Constitutional: She is oriented to person, place, and time. She appears well-developed. She is cooperative.  Non-toxic appearance. She does not appear ill. No distress.   HENT:   Head: Normocephalic and atraumatic.   Ears:   Right Ear: Hearing, tympanic membrane, external ear and ear canal normal.   Left Ear: Hearing, tympanic membrane, external ear and ear canal normal.   Nose: Mucosal edema and congestion present. No rhinorrhea or nasal deformity. No epistaxis. Right sinus exhibits maxillary sinus tenderness and frontal sinus tenderness. Left sinus exhibits maxillary sinus tenderness and frontal sinus tenderness.   Mouth/Throat: Uvula is midline and mucous membranes are normal. No " trismus in the jaw. Normal dentition. No uvula swelling. No oropharyngeal exudate, posterior oropharyngeal edema or posterior oropharyngeal erythema.   Eyes: Conjunctivae and lids are normal. No scleral icterus.   Neck: Trachea normal and phonation normal. Neck supple. No edema present. No erythema present. No neck rigidity present.   Cardiovascular: Normal rate, regular rhythm, normal heart sounds and normal pulses.   Pulmonary/Chest: Effort normal and breath sounds normal. No respiratory distress. She has no decreased breath sounds. She has no rhonchi.   Abdominal: Normal appearance.   Musculoskeletal: Normal range of motion.         General: No deformity. Normal range of motion.   Neurological: She is alert and oriented to person, place, and time. She exhibits normal muscle tone. Coordination normal.   Skin: Skin is warm, dry, intact, not diaphoretic and not pale.   Psychiatric: Her speech is normal and behavior is normal. Judgment and thought content normal.   Nursing note and vitals reviewed.    Assessment:     1. Acute pansinusitis, recurrence not specified    2. Acute cough        Plan:       Acute pansinusitis, recurrence not specified  -     azithromycin (ZITHROMAX) 250 MG tablet; Take 2 tablets (500 mg) on  Day 1,  followed by 1 tablet (250 mg) once daily on Days 2 through 5.  Dispense: 6 tablet; Refill: 0  -     dexAMETHasone injection 6 mg    Acute cough  -     promethazine-dextromethorphan (PROMETHAZINE-DM) 6.25-15 mg/5 mL Syrp; Take 5 mLs by mouth every 4 (four) hours as needed (cough).  Dispense: 118 mL; Refill: 0      Patient Instructions   1.  Take all antibiotics as prescribed.  It is imperative that once you start them, you take them to completion.     2.  For patients above 6 months of age who are not allergic to and are not on anticoagulants, you can alternate Tylenol and Motrin every 4-6 hours for fever above 100.4F and/or pain.  For patients less than 6 months of age, allergic to or intolerant  to NSAIDS, have gastritis, gastric ulcers, or history of GI bleeds, are pregnant, or are on anticoagulant therapy, you can take Tylenol every 4 hours as needed for fever above 100.4F and/or pain.     3.  Rest and keep yourself well hydrated.  Drink hot liquids (coffee, water, tea, hot chocolate, or soup) 10-12 times a day for 5-7 days.  Put liquid in a mug and place in microwave for 2.5 - 3 minutes. Pour hot liquid into another mug not used to microwave the liquid (to avoid burning your mouth) then sniff the steam from the cup and sip the heated liquid.    4.  You can use these over the counter medications/remedies to help with your symptoms:     Runny Nose:  Use an antihistamine such as Claritin, Zyrtec or Allegra to help dry you out.     Congestion:  Use pseudoephedrine (behind the counter) for congestion- Pseudoephedrine 30 mg up to 240 mg /day. Warning:  It can raise your blood pressure and give you palpitations.  Coricidin HBP is okay to use if you have high blood pressure.     Use mucinex (guaifenisin) up to 2400mg/day to break up/loosen any mucous. MucinexDM has a cough suppressant that can be used for cough and at night to stop the tickle in the back of your throat.    Use Nasal Saline to mechanically move any post nasal drip from your eustachian tubes or from the back of your throat.    Use Afrin in each nare, for no longer than 3 days, as it is addictive. It can also dry out your mucous membranes and cause elevated blood pressure.    Use Flonase 1-2 sprays/nostril per day. It is a local acting steroid nasal spray.  If you develop a bloody nose, stop using the medication immediately.    Sore throat:  Use warm, salt water gargles to ease your throat pain- 1/2 tsp salt to 1 cup warm water, gargle as desired.  Chloraseptic sprays and throat lozenges will also help to ease throat pain.     Sometimes Nyquil at night is beneficial to help you get some rest; however, it is sedating and does contain an  antihistamine and Tylenol.  Make sure not to double up on these medications.      These things will help you to feel better and will speed your recovery.  If your condition fails to improve in a timely manner, you should receive another evaluation by your Primary Care Provider/Pediatrician to discuss your concerns or return to urgent care for a recheck.  If your condition worsens at any time, you should report immediately to your nearest Emergency Department for further evaluation. **You must understand that you have received Urgent Care treatment only and that you may be released before all of your medical problems are known or treated. You, the patient, are responsible to arrange for follow-up care as instructed.

## 2024-02-18 NOTE — TELEPHONE ENCOUNTER
Reason for Disposition   Nursing judgment     Requesting information for home PT/OT and clarification to MD visit today in office regarding f/u or diagnostic exams. Provider paged for notification/advisement.    Protocols used: ST NO PROTOCOL CALL: INFORMATION ONLY-A-[place]     no

## 2024-12-13 NOTE — PLAN OF CARE
06/06/17 1526   Discharge Assessment   Assessment Type Discharge Planning Reassessment   Confirmed/corrected address and phone number on facesheet? Yes   Assessment information obtained from? Patient   Expected Length of Stay (days) 3   Communicated expected length of stay with patient/caregiver yes   Prior to hospitilization cognitive status: Alert/Oriented   Prior to hospitalization functional status: Independent   Current cognitive status: Alert/Oriented   Current Functional Status: Independent   Arrived From admitted as an inpatient;acute care hospital   Lives With spouse;child(meredith), adult;grandchild(meredith);parent(s)   Able to Return to Prior Arrangements yes   Is patient able to care for self after discharge? Yes   How many people do you have in your home that can help with your care after discharge? >4   Who are your caregiver(s) and their phone number(s)? Neil [phone], Jaime Cope (son) [phone]   Patient's perception of discharge disposition home or selfcare   Readmission Within The Last 30 Days no previous admission in last 30 days   Patient currently being followed by outpatient case management? No   Patient currently receives home health services? No   Does the patient currently use HME? No   Patient currently receives private duty nursing? N/A   Patient currently receives any other outside agency services? No   Equipment Currently Used at Home none   Do you have any problems affording any of your prescribed medications? No   Is the patient taking medications as prescribed? yes   Do you have any financial concerns preventing you from receiving the healthcare you need? No   Does the patient have transportation to healthcare appointments? Yes   Transportation Available family or friend will provide   On Dialysis? No   Does the patient receive services at the Coumadin Clinic? No   Are there any open cases? No   Discharge Plan A Home with family   Discharge Plan B Home with family;Home Health  Refill protocol met      Patient/Family In Agreement With Plan yes         Discharge/ My Health Packet Folder Given to patient/family:      yes      PCP:    Vinny Coronado MD      Pharmacy:  [facility] [address] in [place]    Emergency Contacts:  Extended Emergency Contact Information  Primary Emergency Contact: Jaime Cope   [place]  Mobile Phone: [phone]  Relation: Son      Insurance:    Payor: BLUE [employer]UE [employer]IELD / Plan: BCBS ALL OUT OF STATE / Product Type: PPO /           Delia Green RN, CCRN-K, [facility]  Neuro-Critical Care   [id number]